# Patient Record
Sex: FEMALE | Race: WHITE | NOT HISPANIC OR LATINO
[De-identification: names, ages, dates, MRNs, and addresses within clinical notes are randomized per-mention and may not be internally consistent; named-entity substitution may affect disease eponyms.]

---

## 2017-03-17 ENCOUNTER — RX RENEWAL (OUTPATIENT)
Age: 67
End: 2017-03-17

## 2017-04-05 ENCOUNTER — NON-APPOINTMENT (OUTPATIENT)
Age: 67
End: 2017-04-05

## 2017-04-05 ENCOUNTER — APPOINTMENT (OUTPATIENT)
Dept: CARDIOLOGY | Facility: CLINIC | Age: 67
End: 2017-04-05

## 2017-04-05 VITALS
DIASTOLIC BLOOD PRESSURE: 76 MMHG | OXYGEN SATURATION: 95 % | SYSTOLIC BLOOD PRESSURE: 126 MMHG | BODY MASS INDEX: 28.71 KG/M2 | WEIGHT: 156 LBS | HEART RATE: 72 BPM | HEIGHT: 62 IN

## 2017-10-03 RX ORDER — AMOXICILLIN AND CLAVULANATE POTASSIUM 875; 125 MG/1; MG/1
875-125 TABLET, COATED ORAL
Qty: 20 | Refills: 0 | Status: COMPLETED | COMMUNITY
Start: 2017-05-01

## 2017-10-04 ENCOUNTER — NON-APPOINTMENT (OUTPATIENT)
Age: 67
End: 2017-10-04

## 2017-10-04 ENCOUNTER — APPOINTMENT (OUTPATIENT)
Dept: CARDIOLOGY | Facility: CLINIC | Age: 67
End: 2017-10-04
Payer: COMMERCIAL

## 2017-10-04 VITALS
WEIGHT: 153 LBS | BODY MASS INDEX: 28.16 KG/M2 | SYSTOLIC BLOOD PRESSURE: 120 MMHG | DIASTOLIC BLOOD PRESSURE: 72 MMHG | HEART RATE: 62 BPM | HEIGHT: 62 IN | OXYGEN SATURATION: 95 %

## 2017-10-04 PROCEDURE — 93000 ELECTROCARDIOGRAM COMPLETE: CPT

## 2017-10-04 PROCEDURE — 99214 OFFICE O/P EST MOD 30 MIN: CPT

## 2017-12-28 ENCOUNTER — APPOINTMENT (OUTPATIENT)
Dept: CARDIOLOGY | Facility: CLINIC | Age: 67
End: 2017-12-28
Payer: MEDICARE

## 2017-12-28 ENCOUNTER — NON-APPOINTMENT (OUTPATIENT)
Age: 67
End: 2017-12-28

## 2017-12-28 VITALS — HEART RATE: 67 BPM | OXYGEN SATURATION: 96 %

## 2017-12-28 VITALS
WEIGHT: 154 LBS | SYSTOLIC BLOOD PRESSURE: 148 MMHG | BODY MASS INDEX: 28.34 KG/M2 | OXYGEN SATURATION: 92 % | HEART RATE: 93 BPM | HEIGHT: 62 IN | DIASTOLIC BLOOD PRESSURE: 83 MMHG

## 2017-12-28 PROCEDURE — 93000 ELECTROCARDIOGRAM COMPLETE: CPT

## 2017-12-28 PROCEDURE — 99214 OFFICE O/P EST MOD 30 MIN: CPT

## 2018-03-03 ENCOUNTER — RX RENEWAL (OUTPATIENT)
Age: 68
End: 2018-03-03

## 2018-04-11 ENCOUNTER — NON-APPOINTMENT (OUTPATIENT)
Age: 68
End: 2018-04-11

## 2018-04-11 ENCOUNTER — APPOINTMENT (OUTPATIENT)
Dept: CARDIOLOGY | Facility: CLINIC | Age: 68
End: 2018-04-11
Payer: MEDICARE

## 2018-04-11 VITALS
DIASTOLIC BLOOD PRESSURE: 75 MMHG | SYSTOLIC BLOOD PRESSURE: 120 MMHG | OXYGEN SATURATION: 95 % | WEIGHT: 155 LBS | HEART RATE: 65 BPM | HEIGHT: 62 IN | BODY MASS INDEX: 28.52 KG/M2

## 2018-04-11 PROCEDURE — 99214 OFFICE O/P EST MOD 30 MIN: CPT

## 2018-04-11 PROCEDURE — 93000 ELECTROCARDIOGRAM COMPLETE: CPT

## 2018-08-15 ENCOUNTER — NON-APPOINTMENT (OUTPATIENT)
Age: 68
End: 2018-08-15

## 2018-08-15 ENCOUNTER — APPOINTMENT (OUTPATIENT)
Dept: CARDIOLOGY | Facility: CLINIC | Age: 68
End: 2018-08-15
Payer: MEDICARE

## 2018-08-15 VITALS
OXYGEN SATURATION: 95 % | SYSTOLIC BLOOD PRESSURE: 126 MMHG | WEIGHT: 156 LBS | HEART RATE: 66 BPM | HEIGHT: 62 IN | DIASTOLIC BLOOD PRESSURE: 80 MMHG | BODY MASS INDEX: 28.71 KG/M2

## 2018-08-15 DIAGNOSIS — E83.52 HYPERCALCEMIA: ICD-10-CM

## 2018-08-15 PROCEDURE — 99214 OFFICE O/P EST MOD 30 MIN: CPT

## 2018-08-15 PROCEDURE — 93000 ELECTROCARDIOGRAM COMPLETE: CPT

## 2018-08-15 RX ORDER — NITROFURANTOIN (MONOHYDRATE/MACROCRYSTALS) 25; 75 MG/1; MG/1
100 CAPSULE ORAL
Qty: 6 | Refills: 0 | Status: DISCONTINUED | COMMUNITY
Start: 2017-09-22 | End: 2018-08-15

## 2018-12-19 ENCOUNTER — APPOINTMENT (OUTPATIENT)
Dept: CARDIOLOGY | Facility: CLINIC | Age: 68
End: 2018-12-19
Payer: MEDICARE

## 2018-12-19 ENCOUNTER — NON-APPOINTMENT (OUTPATIENT)
Age: 68
End: 2018-12-19

## 2018-12-19 VITALS
BODY MASS INDEX: 28.52 KG/M2 | WEIGHT: 155 LBS | DIASTOLIC BLOOD PRESSURE: 84 MMHG | HEIGHT: 62 IN | SYSTOLIC BLOOD PRESSURE: 137 MMHG | HEART RATE: 66 BPM | OXYGEN SATURATION: 98 %

## 2018-12-19 PROCEDURE — 93000 ELECTROCARDIOGRAM COMPLETE: CPT

## 2018-12-19 PROCEDURE — 99214 OFFICE O/P EST MOD 30 MIN: CPT

## 2018-12-19 RX ORDER — OSELTAMIVIR PHOSPHATE 75 MG/1
75 CAPSULE ORAL
Qty: 10 | Refills: 0 | Status: COMPLETED | COMMUNITY
Start: 2018-09-18

## 2018-12-19 NOTE — REASON FOR VISIT
[FreeTextEntry1] : The patient comes in for followup of her hypertension, hypothyroidism, left bundle-branch block, and dyslipidemia. She had her partial hysterectomy and bladder resuspension. It was not completely successful from her point of view. . She denies any chest pains, shortness of breath, or palpitations. .

## 2018-12-19 NOTE — DISCUSSION/SUMMARY
[FreeTextEntry1] : The patient was examined. Her blood pressure was 137/84,  and her  pulse was 66..Her lungs were clear to auscultation. Cardiac exam was negative for murmurs rubs or gallops. The remainder of her physical exam was unremarkable. Her EKG showed  sinus bradycardia with a left bundle branch block pattern. This was unchanged from previous.The patient was instructed to stay on the same medication, and  return in 4  months, or earlier if needed.

## 2018-12-19 NOTE — PHYSICAL EXAM
[General Appearance - Well Developed] : well developed [Normal Appearance] : normal appearance [Well Groomed] : well groomed [General Appearance - Well Nourished] : well nourished [No Deformities] : no deformities [General Appearance - In No Acute Distress] : no acute distress [Normal Conjunctiva] : the conjunctiva exhibited no abnormalities [Eyelids - No Xanthelasma] : the eyelids demonstrated no xanthelasmas [Normal Oral Mucosa] : normal oral mucosa [No Oral Pallor] : no oral pallor [No Oral Cyanosis] : no oral cyanosis [Normal Jugular Venous A Waves Present] : normal jugular venous A waves present [Normal Jugular Venous V Waves Present] : normal jugular venous V waves present [No Jugular Venous Jeong A Waves] : no jugular venous jeong A waves [Respiration, Rhythm And Depth] : normal respiratory rhythm and effort [Exaggerated Use Of Accessory Muscles For Inspiration] : no accessory muscle use [Auscultation Breath Sounds / Voice Sounds] : lungs were clear to auscultation bilaterally [Heart Rate And Rhythm] : heart rate and rhythm were normal [Heart Sounds] : normal S1 and S2 [Murmurs] : no murmurs present [Abdomen Soft] : soft [Abdomen Tenderness] : non-tender [Abdomen Mass (___ Cm)] : no abdominal mass palpated [Abnormal Walk] : normal gait [Gait - Sufficient For Exercise Testing] : the gait was sufficient for exercise testing [Nail Clubbing] : no clubbing of the fingernails [Cyanosis, Localized] : no localized cyanosis [Petechial Hemorrhages (___cm)] : no petechial hemorrhages [Skin Color & Pigmentation] : normal skin color and pigmentation [] : no rash [No Venous Stasis] : no venous stasis [Skin Lesions] : no skin lesions [No Skin Ulcers] : no skin ulcer [No Xanthoma] : no  xanthoma was observed [Oriented To Time, Place, And Person] : oriented to person, place, and time [Affect] : the affect was normal [Mood] : the mood was normal [No Anxiety] : not feeling anxious

## 2019-02-18 ENCOUNTER — RX RENEWAL (OUTPATIENT)
Age: 69
End: 2019-02-18

## 2019-04-10 ENCOUNTER — APPOINTMENT (OUTPATIENT)
Dept: CARDIOLOGY | Facility: CLINIC | Age: 69
End: 2019-04-10
Payer: MEDICARE

## 2019-04-10 ENCOUNTER — NON-APPOINTMENT (OUTPATIENT)
Age: 69
End: 2019-04-10

## 2019-04-10 VITALS — HEIGHT: 62 IN | BODY MASS INDEX: 28.34 KG/M2 | WEIGHT: 154 LBS

## 2019-04-10 VITALS — HEART RATE: 57 BPM | DIASTOLIC BLOOD PRESSURE: 88 MMHG | SYSTOLIC BLOOD PRESSURE: 146 MMHG | OXYGEN SATURATION: 97 %

## 2019-04-10 PROCEDURE — 93000 ELECTROCARDIOGRAM COMPLETE: CPT

## 2019-04-10 PROCEDURE — 99214 OFFICE O/P EST MOD 30 MIN: CPT

## 2019-04-10 RX ORDER — AMOXICILLIN 500 MG/1
500 TABLET, FILM COATED ORAL
Qty: 16 | Refills: 0 | Status: COMPLETED | COMMUNITY
Start: 2018-12-31

## 2019-04-10 NOTE — PHYSICAL EXAM
[General Appearance - Well Developed] : well developed [General Appearance - Well Nourished] : well nourished [Normal Appearance] : normal appearance [Well Groomed] : well groomed [No Deformities] : no deformities [General Appearance - In No Acute Distress] : no acute distress [Eyelids - No Xanthelasma] : the eyelids demonstrated no xanthelasmas [Normal Conjunctiva] : the conjunctiva exhibited no abnormalities [Normal Oral Mucosa] : normal oral mucosa [No Oral Pallor] : no oral pallor [Normal Jugular Venous A Waves Present] : normal jugular venous A waves present [No Oral Cyanosis] : no oral cyanosis [Normal Jugular Venous V Waves Present] : normal jugular venous V waves present [No Jugular Venous Jeong A Waves] : no jugular venous jeong A waves [Respiration, Rhythm And Depth] : normal respiratory rhythm and effort [Exaggerated Use Of Accessory Muscles For Inspiration] : no accessory muscle use [Auscultation Breath Sounds / Voice Sounds] : lungs were clear to auscultation bilaterally [Heart Rate And Rhythm] : heart rate and rhythm were normal [Heart Sounds] : normal S1 and S2 [Abdomen Soft] : soft [Murmurs] : no murmurs present [Abdomen Tenderness] : non-tender [Gait - Sufficient For Exercise Testing] : the gait was sufficient for exercise testing [Abnormal Walk] : normal gait [Abdomen Mass (___ Cm)] : no abdominal mass palpated [Nail Clubbing] : no clubbing of the fingernails [Cyanosis, Localized] : no localized cyanosis [Petechial Hemorrhages (___cm)] : no petechial hemorrhages [Skin Color & Pigmentation] : normal skin color and pigmentation [No Venous Stasis] : no venous stasis [] : no rash [Skin Lesions] : no skin lesions [No Xanthoma] : no  xanthoma was observed [No Skin Ulcers] : no skin ulcer [Affect] : the affect was normal [Oriented To Time, Place, And Person] : oriented to person, place, and time [Mood] : the mood was normal [No Anxiety] : not feeling anxious

## 2019-04-10 NOTE — DISCUSSION/SUMMARY
[FreeTextEntry1] : The patient was examined. Her blood pressure was 146/88,  and her  pulse was 56..Her lungs were clear to auscultation. Cardiac exam was negative for murmurs rubs or gallops. The remainder of her physical exam was unremarkable. Her EKG showed  sinus bradycardia with a left bundle branch block pattern. This was unchanged from previous.The patient was instructed to stay on the same medication, and  return in 4  months, or earlier if needed.

## 2019-04-10 NOTE — REVIEW OF SYSTEMS
[Negative] : Endocrine [Shortness Of Breath] : no shortness of breath [Chest Pain] : no chest pain [Palpitations] : no palpitations

## 2019-05-13 ENCOUNTER — RX RENEWAL (OUTPATIENT)
Age: 69
End: 2019-05-13

## 2019-08-07 ENCOUNTER — APPOINTMENT (OUTPATIENT)
Dept: CARDIOLOGY | Facility: CLINIC | Age: 69
End: 2019-08-07
Payer: MEDICARE

## 2019-08-07 ENCOUNTER — NON-APPOINTMENT (OUTPATIENT)
Age: 69
End: 2019-08-07

## 2019-08-07 VITALS
SYSTOLIC BLOOD PRESSURE: 132 MMHG | DIASTOLIC BLOOD PRESSURE: 82 MMHG | WEIGHT: 148 LBS | OXYGEN SATURATION: 98 % | BODY MASS INDEX: 27.23 KG/M2 | HEART RATE: 54 BPM | RESPIRATION RATE: 14 BRPM | HEIGHT: 62 IN

## 2019-08-07 DIAGNOSIS — N83.202 UNSPECIFIED OVARIAN CYST, LEFT SIDE: ICD-10-CM

## 2019-08-07 PROCEDURE — 99214 OFFICE O/P EST MOD 30 MIN: CPT

## 2019-08-07 PROCEDURE — 93000 ELECTROCARDIOGRAM COMPLETE: CPT

## 2019-08-07 RX ORDER — SODIUM SULFATE, POTASSIUM SULFATE, MAGNESIUM SULFATE 17.5; 3.13; 1.6 G/ML; G/ML; G/ML
17.5-3.13-1.6 SOLUTION, CONCENTRATE ORAL
Qty: 354 | Refills: 0 | Status: COMPLETED | COMMUNITY
Start: 2019-04-17

## 2019-08-07 NOTE — DISCUSSION/SUMMARY
[FreeTextEntry1] : The patient was examined. Her blood pressure was 132/82,  and her  pulse was 54..Her lungs were clear to auscultation. Cardiac exam was negative for murmurs rubs or gallops. The remainder of her physical exam was unremarkable. Her EKG showed  sinus bradycardia with a left bundle branch block pattern. This was unchanged from previous.The patient was instructed to stay on the same medication, and  return in 4  months, or earlier if needed.

## 2019-08-07 NOTE — REASON FOR VISIT
[FreeTextEntry1] : The patient comes in for followup of her hypertension, hypothyroidism, left bundle-branch block, and dyslipidemia. She had her partial hysterectomy and bladder resuspension. It was not completely successful from her point of view. . She denies any chest pains, shortness of breath, or palpitations. .\par August 7, 2019: The patient feels well. She has no new complaints. She denies any chest pains, shortness breath, or palpitations. She is walking more.

## 2019-08-08 ENCOUNTER — RESULT REVIEW (OUTPATIENT)
Age: 69
End: 2019-08-08

## 2019-08-08 LAB
25(OH)D3 SERPL-MCNC: 60.8 NG/ML
ALBUMIN SERPL ELPH-MCNC: 4.4 G/DL
ALP BLD-CCNC: 53 U/L
ALT SERPL-CCNC: 17 U/L
ANION GAP SERPL CALC-SCNC: 10 MMOL/L
AST SERPL-CCNC: 19 U/L
BASOPHILS # BLD AUTO: 0.02 K/UL
BASOPHILS NFR BLD AUTO: 0.4 %
BILIRUB SERPL-MCNC: 0.4 MG/DL
BUN SERPL-MCNC: 22 MG/DL
CALCIUM SERPL-MCNC: 10.3 MG/DL
CHLORIDE SERPL-SCNC: 106 MMOL/L
CHOLEST SERPL-MCNC: 146 MG/DL
CHOLEST/HDLC SERPL: 2.5 RATIO
CO2 SERPL-SCNC: 24 MMOL/L
CREAT SERPL-MCNC: 0.68 MG/DL
EOSINOPHIL # BLD AUTO: 0.07 K/UL
EOSINOPHIL NFR BLD AUTO: 1.3 %
ESTIMATED AVERAGE GLUCOSE: 117 MG/DL
GLUCOSE SERPL-MCNC: 98 MG/DL
HBA1C MFR BLD HPLC: 5.7 %
HCT VFR BLD CALC: 44.8 %
HDLC SERPL-MCNC: 59 MG/DL
HGB BLD-MCNC: 14.2 G/DL
IMM GRANULOCYTES NFR BLD AUTO: 0.2 %
LDLC SERPL CALC-MCNC: 64 MG/DL
LYMPHOCYTES # BLD AUTO: 0.84 K/UL
LYMPHOCYTES NFR BLD AUTO: 15.9 %
MAN DIFF?: NORMAL
MCHC RBC-ENTMCNC: 31.3 PG
MCHC RBC-ENTMCNC: 31.7 GM/DL
MCV RBC AUTO: 98.7 FL
MONOCYTES # BLD AUTO: 0.4 K/UL
MONOCYTES NFR BLD AUTO: 7.6 %
NEUTROPHILS # BLD AUTO: 3.95 K/UL
NEUTROPHILS NFR BLD AUTO: 74.6 %
PLATELET # BLD AUTO: 220 K/UL
POTASSIUM SERPL-SCNC: 4.8 MMOL/L
PROT SERPL-MCNC: 6.8 G/DL
RBC # BLD: 4.54 M/UL
RBC # FLD: 14.7 %
SODIUM SERPL-SCNC: 140 MMOL/L
T4 SERPL-MCNC: 7.4 UG/DL
TRIGL SERPL-MCNC: 113 MG/DL
TSH SERPL-ACNC: 1.49 UIU/ML
WBC # FLD AUTO: 5.29 K/UL

## 2019-12-04 ENCOUNTER — APPOINTMENT (OUTPATIENT)
Dept: CARDIOLOGY | Facility: CLINIC | Age: 69
End: 2019-12-04
Payer: MEDICARE

## 2019-12-04 ENCOUNTER — NON-APPOINTMENT (OUTPATIENT)
Age: 69
End: 2019-12-04

## 2019-12-04 VITALS
HEART RATE: 65 BPM | BODY MASS INDEX: 27.44 KG/M2 | OXYGEN SATURATION: 97 % | SYSTOLIC BLOOD PRESSURE: 134 MMHG | WEIGHT: 150 LBS | DIASTOLIC BLOOD PRESSURE: 84 MMHG

## 2019-12-04 PROCEDURE — 99214 OFFICE O/P EST MOD 30 MIN: CPT

## 2019-12-04 PROCEDURE — 93000 ELECTROCARDIOGRAM COMPLETE: CPT

## 2019-12-04 NOTE — REASON FOR VISIT
[FreeTextEntry1] : The patient comes in for followup of her hypertension, hypothyroidism, left bundle-branch block, and dyslipidemia. She had her partial hysterectomy and bladder resuspension. It was not completely successful from her point of view. . She denies any chest pains, shortness of breath, or palpitations. .\par August 7, 2019: The patient feels well. She has no new complaints. She denies any chest pains, shortness breath, or palpitations. She is walking more.\par December 4, 2019: The patient has an upper respiratory infection. Her  has bronchitis. She denies any chest pains, shortness breath, or palpitations. She is a slight cough and some nasal congestion.

## 2019-12-04 NOTE — PHYSICAL EXAM
[General Appearance - Well Developed] : well developed [Normal Appearance] : normal appearance [Well Groomed] : well groomed [General Appearance - Well Nourished] : well nourished [No Deformities] : no deformities [General Appearance - In No Acute Distress] : no acute distress [Normal Conjunctiva] : the conjunctiva exhibited no abnormalities [Eyelids - No Xanthelasma] : the eyelids demonstrated no xanthelasmas [Normal Oral Mucosa] : normal oral mucosa [No Oral Pallor] : no oral pallor [Normal Jugular Venous A Waves Present] : normal jugular venous A waves present [No Oral Cyanosis] : no oral cyanosis [No Jugular Venous Jeong A Waves] : no jugular venous jeong A waves [Normal Jugular Venous V Waves Present] : normal jugular venous V waves present [Respiration, Rhythm And Depth] : normal respiratory rhythm and effort [Exaggerated Use Of Accessory Muscles For Inspiration] : no accessory muscle use [Auscultation Breath Sounds / Voice Sounds] : lungs were clear to auscultation bilaterally [Heart Rate And Rhythm] : heart rate and rhythm were normal [Heart Sounds] : normal S1 and S2 [Murmurs] : no murmurs present [Abdomen Soft] : soft [Abdomen Tenderness] : non-tender [Abdomen Mass (___ Cm)] : no abdominal mass palpated [Gait - Sufficient For Exercise Testing] : the gait was sufficient for exercise testing [Abnormal Walk] : normal gait [Nail Clubbing] : no clubbing of the fingernails [Cyanosis, Localized] : no localized cyanosis [Petechial Hemorrhages (___cm)] : no petechial hemorrhages [Skin Color & Pigmentation] : normal skin color and pigmentation [] : no rash [No Venous Stasis] : no venous stasis [Skin Lesions] : no skin lesions [Oriented To Time, Place, And Person] : oriented to person, place, and time [No Skin Ulcers] : no skin ulcer [No Xanthoma] : no  xanthoma was observed [Mood] : the mood was normal [Affect] : the affect was normal [No Anxiety] : not feeling anxious

## 2019-12-04 NOTE — REVIEW OF SYSTEMS
[Negative] : Heme/Lymph [see HPI] : see HPI [Cough] : cough [Sore Throat] : no sore throat [Shortness Of Breath] : no shortness of breath [Chest Pain] : no chest pain [Palpitations] : no palpitations

## 2019-12-04 NOTE — DISCUSSION/SUMMARY
[FreeTextEntry1] : The patient was examined. Her blood pressure was 134/84,  and her pulse was 65..Her lungs were clear to auscultation. Cardiac exam was negative for murmurs rubs or gallops. The remainder of her physical exam was unremarkable. Her EKG showed  sinus rhythm, with a left bundle branch block pattern. This was unchanged from previous.The patient was instructed to stay on the same medication, and  return in 4  months, or earlier if needed.

## 2020-05-04 ENCOUNTER — RX RENEWAL (OUTPATIENT)
Age: 70
End: 2020-05-04

## 2020-06-10 ENCOUNTER — NON-APPOINTMENT (OUTPATIENT)
Age: 70
End: 2020-06-10

## 2020-06-10 ENCOUNTER — APPOINTMENT (OUTPATIENT)
Dept: CARDIOLOGY | Facility: CLINIC | Age: 70
End: 2020-06-10
Payer: MEDICARE

## 2020-06-10 VITALS
HEART RATE: 64 BPM | OXYGEN SATURATION: 94 % | WEIGHT: 151 LBS | BODY MASS INDEX: 27.79 KG/M2 | DIASTOLIC BLOOD PRESSURE: 81 MMHG | HEIGHT: 62 IN | TEMPERATURE: 97.9 F | SYSTOLIC BLOOD PRESSURE: 134 MMHG

## 2020-06-10 VITALS — HEART RATE: 60 BPM | OXYGEN SATURATION: 98 %

## 2020-06-10 PROCEDURE — 93000 ELECTROCARDIOGRAM COMPLETE: CPT

## 2020-06-10 PROCEDURE — 99214 OFFICE O/P EST MOD 30 MIN: CPT

## 2020-06-10 NOTE — REVIEW OF SYSTEMS
[see HPI] : see HPI [Negative] : Heme/Lymph [Sore Throat] : no sore throat [Shortness Of Breath] : no shortness of breath [Chest Pain] : no chest pain [Palpitations] : no palpitations [Cough] : no cough

## 2020-06-10 NOTE — REASON FOR VISIT
[FreeTextEntry1] : The patient comes in for followup of her hypertension, hypothyroidism, left bundle-branch block, and dyslipidemia. She had her partial hysterectomy and bladder resuspension. It was not completely successful from her point of view. . She denies any chest pains, shortness of breath, or palpitations. .\par August 7, 2019: The patient feels well. She has no new complaints. She denies any chest pains, shortness breath, or palpitations. She is walking more.\par December 4, 2019: The patient has an upper respiratory infection. Her  has bronchitis. She denies any chest pains, shortness breath, or palpitations. She is a slight cough and some nasal congestion.\par Jessi 10, 2020: Patient feels well.  She denies any chest pains, shortness of breath, or palpitations.  She will be seeing her primary care physician within the next several weeks and he will be doing a blood test.  We will try to get a scan results.

## 2020-06-10 NOTE — PHYSICAL EXAM
[General Appearance - Well Developed] : well developed [Normal Appearance] : normal appearance [Well Groomed] : well groomed [No Deformities] : no deformities [General Appearance - Well Nourished] : well nourished [Normal Conjunctiva] : the conjunctiva exhibited no abnormalities [General Appearance - In No Acute Distress] : no acute distress [Eyelids - No Xanthelasma] : the eyelids demonstrated no xanthelasmas [Normal Oral Mucosa] : normal oral mucosa [No Oral Pallor] : no oral pallor [No Oral Cyanosis] : no oral cyanosis [Normal Jugular Venous A Waves Present] : normal jugular venous A waves present [Normal Jugular Venous V Waves Present] : normal jugular venous V waves present [No Jugular Venous Jeong A Waves] : no jugular venous jeong A waves [Respiration, Rhythm And Depth] : normal respiratory rhythm and effort [Exaggerated Use Of Accessory Muscles For Inspiration] : no accessory muscle use [Auscultation Breath Sounds / Voice Sounds] : lungs were clear to auscultation bilaterally [Heart Rate And Rhythm] : heart rate and rhythm were normal [Murmurs] : no murmurs present [Heart Sounds] : normal S1 and S2 [Abdomen Soft] : soft [Abdomen Tenderness] : non-tender [Abdomen Mass (___ Cm)] : no abdominal mass palpated [Abnormal Walk] : normal gait [Gait - Sufficient For Exercise Testing] : the gait was sufficient for exercise testing [Nail Clubbing] : no clubbing of the fingernails [Cyanosis, Localized] : no localized cyanosis [Petechial Hemorrhages (___cm)] : no petechial hemorrhages [Skin Color & Pigmentation] : normal skin color and pigmentation [] : no rash [No Venous Stasis] : no venous stasis [Skin Lesions] : no skin lesions [No Skin Ulcers] : no skin ulcer [No Xanthoma] : no  xanthoma was observed [Oriented To Time, Place, And Person] : oriented to person, place, and time [Affect] : the affect was normal [Mood] : the mood was normal [No Anxiety] : not feeling anxious

## 2020-06-10 NOTE — DISCUSSION/SUMMARY
[FreeTextEntry1] : The patient was examined. Her blood pressure was 134/81,  and her pulse was 60..Her lungs were clear to auscultation. Cardiac exam was negative for murmurs rubs or gallops. The remainder of her physical exam was unremarkable. Her EKG showed  sinus rhythm, with a left bundle branch block pattern. This was unchanged from previous.The patient was instructed to stay on the same medication, and  return in 6  months, or earlier if needed.

## 2020-06-30 ENCOUNTER — FORM ENCOUNTER (OUTPATIENT)
Age: 70
End: 2020-06-30

## 2020-12-09 ENCOUNTER — APPOINTMENT (OUTPATIENT)
Dept: CARDIOLOGY | Facility: CLINIC | Age: 70
End: 2020-12-09
Payer: MEDICARE

## 2020-12-09 ENCOUNTER — NON-APPOINTMENT (OUTPATIENT)
Age: 70
End: 2020-12-09

## 2020-12-09 VITALS
HEART RATE: 63 BPM | WEIGHT: 156 LBS | BODY MASS INDEX: 28.71 KG/M2 | OXYGEN SATURATION: 98 % | SYSTOLIC BLOOD PRESSURE: 150 MMHG | DIASTOLIC BLOOD PRESSURE: 93 MMHG | HEIGHT: 62 IN

## 2020-12-09 VITALS — DIASTOLIC BLOOD PRESSURE: 86 MMHG | SYSTOLIC BLOOD PRESSURE: 124 MMHG

## 2020-12-09 PROCEDURE — 93000 ELECTROCARDIOGRAM COMPLETE: CPT

## 2020-12-09 PROCEDURE — 99214 OFFICE O/P EST MOD 30 MIN: CPT

## 2020-12-09 NOTE — REASON FOR VISIT
[FreeTextEntry1] : The patient comes in for followup of her hypertension, hypothyroidism, left bundle-branch block, and dyslipidemia. She had her partial hysterectomy and bladder resuspension. It was not completely successful from her point of view. . She denies any chest pains, shortness of breath, or palpitations. .\par August 7, 2019: The patient feels well. She has no new complaints. She denies any chest pains, shortness breath, or palpitations. She is walking more.\par December 4, 2019: The patient has an upper respiratory infection. Her  has bronchitis. She denies any chest pains, shortness breath, or palpitations. She is a slight cough and some nasal congestion.\par Jessi 10, 2020: Patient feels well.  She denies any chest pains, shortness of breath, or palpitations.  She will be seeing her primary care physician within the next several weeks and he will be doing a blood test.  We will try to get a scan results.\par December 9, 2020: The patient fell going down stairs in the beginning of November 2020 and hurt her right anterior chest.  She now only has occasional pains in the right anterior and right lateral upper chest.  She denies any other chest pains, shortness of breath, or palpitations.  She admits to not watching salt as carefully as she should and her diet

## 2021-06-09 ENCOUNTER — NON-APPOINTMENT (OUTPATIENT)
Age: 71
End: 2021-06-09

## 2021-06-09 ENCOUNTER — APPOINTMENT (OUTPATIENT)
Dept: CARDIOLOGY | Facility: CLINIC | Age: 71
End: 2021-06-09
Payer: MEDICARE

## 2021-06-09 VITALS
HEART RATE: 62 BPM | DIASTOLIC BLOOD PRESSURE: 81 MMHG | SYSTOLIC BLOOD PRESSURE: 135 MMHG | OXYGEN SATURATION: 95 % | TEMPERATURE: 97.7 F | BODY MASS INDEX: 28.71 KG/M2 | WEIGHT: 156 LBS | HEIGHT: 62 IN

## 2021-06-09 PROCEDURE — 93000 ELECTROCARDIOGRAM COMPLETE: CPT

## 2021-06-09 PROCEDURE — 99214 OFFICE O/P EST MOD 30 MIN: CPT

## 2021-06-09 NOTE — DISCUSSION/SUMMARY
[FreeTextEntry1] : The patient was examined. Her blood pressure was 135/81,  and her pulse was 62..Her lungs were clear to auscultation. Cardiac exam was negative for murmurs rubs or gallops. The remainder of her physical exam was unremarkable. Her EKG showed  sinus bradycardia, with a left bundle branch block pattern. This was unchanged from previous.The patient was instructed to stay on the same medication, and  return in 6  months, or earlier if needed.  Total time spent on the day of the encounter was 31 minutes which includes  face-to-face and non face-to-face times personally spent by the physician preparing to see the patient, obtaining  separately obtained history, performing a medically appropriate exam and evaluation, counseling, educating, talking to the family or caregivers, ordering medicines, ordering tests or procedures, referring and communicating with other healthcare foods professionals, and documenting clinical information in the electronic health record.

## 2021-06-09 NOTE — REASON FOR VISIT
[FreeTextEntry1] : The patient comes in for followup of her hypertension, hypothyroidism, left bundle-branch block, and dyslipidemia. She had her partial hysterectomy and bladder resuspension. It was not completely successful from her point of view. . She denies any chest pains, shortness of breath, or palpitations. .\par August 7, 2019: The patient feels well. She has no new complaints. She denies any chest pains, shortness breath, or palpitations. She is walking more.\par December 4, 2019: The patient has an upper respiratory infection. Her  has bronchitis. She denies any chest pains, shortness breath, or palpitations. She is a slight cough and some nasal congestion.\par Jessi 10, 2020: Patient feels well.  She denies any chest pains, shortness of breath, or palpitations.  She will be seeing her primary care physician within the next several weeks and he will be doing a blood test.  We will try to get a scan results.\par December 9, 2020: The patient fell going down stairs in the beginning of November 2020 and hurt her right anterior chest.  She now only has occasional pains in the right anterior and right lateral upper chest.  She denies any other chest pains, shortness of breath, or palpitations.  She admits to not watching salt as carefully as she should and her diet\par June 9, 2021: The patient feels well.  She got her COVID-19 vaccinations.  She denies any chest pains, shortness of breath, or palpitations.

## 2021-12-08 ENCOUNTER — APPOINTMENT (OUTPATIENT)
Dept: CARDIOLOGY | Facility: CLINIC | Age: 71
End: 2021-12-08
Payer: MEDICARE

## 2021-12-08 ENCOUNTER — NON-APPOINTMENT (OUTPATIENT)
Age: 71
End: 2021-12-08

## 2021-12-08 VITALS
DIASTOLIC BLOOD PRESSURE: 77 MMHG | WEIGHT: 149 LBS | HEART RATE: 71 BPM | SYSTOLIC BLOOD PRESSURE: 145 MMHG | BODY MASS INDEX: 27.25 KG/M2 | OXYGEN SATURATION: 98 %

## 2021-12-08 DIAGNOSIS — E03.9 HYPOTHYROIDISM, UNSPECIFIED: ICD-10-CM

## 2021-12-08 DIAGNOSIS — M48.00 SPINAL STENOSIS, SITE UNSPECIFIED: ICD-10-CM

## 2021-12-08 PROCEDURE — 99214 OFFICE O/P EST MOD 30 MIN: CPT

## 2021-12-08 PROCEDURE — 93000 ELECTROCARDIOGRAM COMPLETE: CPT

## 2021-12-08 NOTE — DISCUSSION/SUMMARY
[FreeTextEntry1] : The patient was examined. Her blood pressure was 145/77,  and her pulse was 71..Her lungs were clear to auscultation. Cardiac exam was negative for murmurs rubs or gallops. The remainder of her physical exam was unremarkable. Her EKG showed  sinus bradycardia, with a left bundle branch block pattern. This was unchanged from previous.The patient was instructed to stay on the same medication, and  return in 6  months, or earlier if needed.  Total time spent on the day of the encounter was 31 minutes which includes  face-to-face and non face-to-face times personally spent by the physician preparing to see the patient, obtaining  separately obtained history, performing a medically appropriate exam and evaluation, counseling, educating, talking to the family or caregivers, ordering medicines, ordering tests or procedures, referring and communicating with other healthcare professionals, and documenting clinical information in the electronic health record.

## 2022-04-28 ENCOUNTER — RX RENEWAL (OUTPATIENT)
Age: 72
End: 2022-04-28

## 2022-06-08 ENCOUNTER — APPOINTMENT (OUTPATIENT)
Dept: CARDIOLOGY | Facility: CLINIC | Age: 72
End: 2022-06-08
Payer: MEDICARE

## 2022-06-08 ENCOUNTER — NON-APPOINTMENT (OUTPATIENT)
Age: 72
End: 2022-06-08

## 2022-06-08 VITALS
BODY MASS INDEX: 27.79 KG/M2 | DIASTOLIC BLOOD PRESSURE: 78 MMHG | WEIGHT: 151 LBS | OXYGEN SATURATION: 96 % | HEART RATE: 62 BPM | HEIGHT: 62 IN | SYSTOLIC BLOOD PRESSURE: 124 MMHG

## 2022-06-08 PROCEDURE — 93000 ELECTROCARDIOGRAM COMPLETE: CPT

## 2022-06-08 PROCEDURE — 99214 OFFICE O/P EST MOD 30 MIN: CPT

## 2022-06-08 NOTE — DISCUSSION/SUMMARY
[FreeTextEntry1] : The patient was examined. Her blood pressure was 124/78,  and her pulse was 62..Her lungs were clear to auscultation. Cardiac exam was negative for murmurs rubs or gallops. The remainder of her physical exam was unremarkable. Her EKG showed  sinus rhythm, with a left bundle branch block pattern. This was unchanged from previous.The patient was instructed to stay on the same medication, and  return in 6  months, or earlier if needed.  Total time spent on the day of the encounter was 32 minutes which includes  face-to-face and non face-to-face times personally spent by the physician preparing to see the patient, obtaining  separately obtained history, performing a medically appropriate exam and evaluation, counseling, educating, talking to the family or caregivers, ordering medicines, ordering tests or procedures, referring and communicating with other healthcare professionals, and documenting clinical information in the electronic health record.

## 2022-06-08 NOTE — REASON FOR VISIT
[FreeTextEntry1] : The patient comes in for followup of her hypertension, hypothyroidism, left bundle-branch block, and dyslipidemia. She had her partial hysterectomy and bladder resuspension. It was not completely successful from her point of view. . She denies any chest pains, shortness of breath, or palpitations. .\par August 7, 2019: The patient feels well. She has no new complaints. She denies any chest pains, shortness breath, or palpitations. She is walking more.\par December 4, 2019: The patient has an upper respiratory infection. Her  has bronchitis. She denies any chest pains, shortness breath, or palpitations. She is a slight cough and some nasal congestion.\par Jessi 10, 2020: Patient feels well.  She denies any chest pains, shortness of breath, or palpitations.  She will be seeing her primary care physician within the next several weeks and he will be doing a blood test.  We will try to get a scan results.\par December 9, 2020: The patient fell going down stairs in the beginning of November 2020 and hurt her right anterior chest.  She now only has occasional pains in the right anterior and right lateral upper chest.  She denies any other chest pains, shortness of breath, or palpitations.  She admits to not watching salt as carefully as she should and her diet\par June 9, 2021: The patient feels well.  She got her COVID-19 vaccinations.  She denies any chest pains, shortness of breath, or palpitations.\par December 8, 2021: The patient has been diagnosed with spinal stenosis.  She went to a pain management doctor who has given her to epidural steroid injections.  She denies any chest pains, shortness of breath, or palpitations.\par June 8, 2022: The patient got her epidural steroid shots and her spinal stenosis seems to be better.  She denies any chest pains, shortness of breath, or palpitations.

## 2022-07-08 DIAGNOSIS — Z87.42 PERSONAL HISTORY OF OTHER DISEASES OF THE FEMALE GENITAL TRACT: ICD-10-CM

## 2022-07-08 DIAGNOSIS — Z20.828 CONTACT WITH AND (SUSPECTED) EXPOSURE TO OTHER VIRAL COMMUNICABLE DISEASES: ICD-10-CM

## 2022-07-08 DIAGNOSIS — Z98.890 OTHER SPECIFIED POSTPROCEDURAL STATES: ICD-10-CM

## 2022-07-08 DIAGNOSIS — N60.19 DIFFUSE CYSTIC MASTOPATHY OF UNSPECIFIED BREAST: ICD-10-CM

## 2022-07-08 DIAGNOSIS — R31.29 OTHER MICROSCOPIC HEMATURIA: ICD-10-CM

## 2022-07-08 DIAGNOSIS — N81.6 RECTOCELE: ICD-10-CM

## 2022-07-08 DIAGNOSIS — N81.11 CYSTOCELE, MIDLINE: ICD-10-CM

## 2022-07-26 DIAGNOSIS — R92.8 OTHER ABNORMAL AND INCONCLUSIVE FINDINGS ON DIAGNOSTIC IMAGING OF BREAST: ICD-10-CM

## 2022-07-29 ENCOUNTER — APPOINTMENT (OUTPATIENT)
Dept: OBGYN | Facility: CLINIC | Age: 72
End: 2022-07-29

## 2022-07-29 VITALS
DIASTOLIC BLOOD PRESSURE: 70 MMHG | HEIGHT: 62 IN | WEIGHT: 152 LBS | SYSTOLIC BLOOD PRESSURE: 128 MMHG | RESPIRATION RATE: 12 BRPM | OXYGEN SATURATION: 98 % | BODY MASS INDEX: 27.97 KG/M2 | HEART RATE: 64 BPM

## 2022-07-29 DIAGNOSIS — Z12.39 ENCOUNTER FOR OTHER SCREENING FOR MALIGNANT NEOPLASM OF BREAST: ICD-10-CM

## 2022-07-29 DIAGNOSIS — Z12.4 ENCOUNTER FOR SCREENING FOR MALIGNANT NEOPLASM OF CERVIX: ICD-10-CM

## 2022-07-29 DIAGNOSIS — N39.3 STRESS INCONTINENCE (FEMALE) (MALE): ICD-10-CM

## 2022-07-29 DIAGNOSIS — Z01.419 ENCOUNTER FOR GYNECOLOGICAL EXAMINATION (GENERAL) (ROUTINE) W/OUT ABNORMAL FINDINGS: ICD-10-CM

## 2022-07-29 DIAGNOSIS — Z12.11 ENCOUNTER FOR SCREENING FOR MALIGNANT NEOPLASM OF COLON: ICD-10-CM

## 2022-07-29 DIAGNOSIS — Z13.31 ENCOUNTER FOR SCREENING FOR DEPRESSION: ICD-10-CM

## 2022-07-29 LAB
DATE COLLECTED: NORMAL
HEMOCCULT SP1 STL QL: NEGATIVE

## 2022-07-29 PROCEDURE — G0101: CPT

## 2022-07-29 PROCEDURE — 82270 OCCULT BLOOD FECES: CPT

## 2022-07-29 NOTE — HISTORY OF PRESENT ILLNESS
[Y] : Positive pregnancy history [Hot Flashes] : hot flashes [Night Sweats] : night sweats [unknown] : Patient is unsure of the date of her LMP [Currently Active] : currently active [Men] : men [No] : No [FreeTextEntry1] : Doing well, offers no complaints\par Recent Mammo/Sono [TextBox_4] : Presents for routine GYN care and offers no complaints. [Mammogramdate] : 7/22/22 [BreastSonogramDate] : 7/22/22 [ColonoscopyDate] : 2018 [BoneDensityDate] : 7/22/22 [PGHxTotal] : 2

## 2022-07-29 NOTE — PHYSICAL EXAM
[Examination Of The Breasts] : a normal appearance [No Masses] : no breast masses were palpable [Labia Majora] : normal [Labia Minora] : normal [Uterine Adnexae] : normal [Normal rectal exam] : was normal [Normal Brown Stool] : was normal and brown [Occult Blood Positive] : was negative for occult blood analysis [Internal Hemorrhoid] : no internal hemorrhoids were present [External Hemorrhoid] : no external hemorrhoids were present [Skin Tags] : no residual hemorrhoidal skin tags [Normal] : was normal [None] : there was no rectal mass

## 2022-07-29 NOTE — PLAN
[FreeTextEntry1] : 1)  Self breast exam instructions, calcium supplementation discussed with the patient.\par 2)  Mammography, lipid profile assessment, TSH screening, fasting glucose testing, colonoscopy screening were discussed with the patient.  Vitamin D supplementation\par 3)  Maintain healthy weight.\par 4)  Regular health maintenance with PCP.\par 5)  Remain tobacco free.\par 6)  Limit alcohol intake to less than 5 drinks per week.\par 7)  Osteoporosis screening.\par 8)  Annual cholesterol screening.\par 9)  Annual influenza vaccine.The importance of routine physical activity was reviewed and a goal of 150 minutes of moderately vigorous exercise per week was endorsed.\par .breast

## 2022-08-01 LAB — HPV HIGH+LOW RISK DNA PNL CVX: NOT DETECTED

## 2022-08-04 LAB — CYTOLOGY CVX/VAG DOC THIN PREP: NORMAL

## 2022-08-05 ENCOUNTER — NON-APPOINTMENT (OUTPATIENT)
Age: 72
End: 2022-08-05

## 2022-12-07 ENCOUNTER — APPOINTMENT (OUTPATIENT)
Dept: CARDIOLOGY | Facility: CLINIC | Age: 72
End: 2022-12-07

## 2022-12-12 ENCOUNTER — APPOINTMENT (OUTPATIENT)
Dept: CARDIOLOGY | Facility: CLINIC | Age: 72
End: 2022-12-12

## 2022-12-12 ENCOUNTER — NON-APPOINTMENT (OUTPATIENT)
Age: 72
End: 2022-12-12

## 2022-12-12 VITALS
BODY MASS INDEX: 27.23 KG/M2 | HEART RATE: 61 BPM | OXYGEN SATURATION: 98 % | WEIGHT: 148 LBS | DIASTOLIC BLOOD PRESSURE: 92 MMHG | SYSTOLIC BLOOD PRESSURE: 146 MMHG | HEIGHT: 62 IN

## 2022-12-12 PROCEDURE — 99214 OFFICE O/P EST MOD 30 MIN: CPT

## 2022-12-12 PROCEDURE — 93000 ELECTROCARDIOGRAM COMPLETE: CPT

## 2022-12-12 NOTE — DISCUSSION/SUMMARY
[EKG obtained to assist in diagnosis and management of assessed problem(s)] : EKG obtained to assist in diagnosis and management of assessed problem(s) [FreeTextEntry1] : The patient was examined. Her blood pressure was 146/92,  and her pulse was 61..Her lungs were clear to auscultation. Cardiac exam was negative for murmurs rubs or gallops. The remainder of her physical exam was unremarkable. Her EKG showed  sinus rhythm, with a left bundle branch block pattern. This was unchanged from previous.The patient was instructed to stay on the same medication, and  return in 6  months, or earlier if needed.  Total time spent on the day of the encounter was 32 minutes which includes  face-to-face and non face-to-face times personally spent by the physician preparing to see the patient, obtaining  separately obtained history, performing a medically appropriate exam and evaluation, counseling, educating, talking to the family or caregivers, ordering medicines, ordering tests or procedures, referring and communicating with other healthcare professionals, and documenting clinical information in the electronic health record.

## 2022-12-12 NOTE — REASON FOR VISIT
[FreeTextEntry1] : The patient comes in for followup of her hypertension, hypothyroidism, left bundle-branch block, and dyslipidemia. She had her partial hysterectomy and bladder resuspension. It was not completely successful from her point of view. . She denies any chest pains, shortness of breath, or palpitations. .\par August 7, 2019: The patient feels well. She has no new complaints. She denies any chest pains, shortness breath, or palpitations. She is walking more.\par December 4, 2019: The patient has an upper respiratory infection. Her  has bronchitis. She denies any chest pains, shortness breath, or palpitations. She is a slight cough and some nasal congestion.\par Jessi 10, 2020: Patient feels well.  She denies any chest pains, shortness of breath, or palpitations.  She will be seeing her primary care physician within the next several weeks and he will be doing a blood test.  We will try to get a scan results.\par December 9, 2020: The patient fell going down stairs in the beginning of November 2020 and hurt her right anterior chest.  She now only has occasional pains in the right anterior and right lateral upper chest.  She denies any other chest pains, shortness of breath, or palpitations.  She admits to not watching salt as carefully as she should and her diet\par June 9, 2021: The patient feels well.  She got her COVID-19 vaccinations.  She denies any chest pains, shortness of breath, or palpitations.\par December 8, 2021: The patient has been diagnosed with spinal stenosis.  She went to a pain management doctor who has given her to epidural steroid injections.  She denies any chest pains, shortness of breath, or palpitations.\par June 8, 2022: The patient got her epidural steroid shots and her spinal stenosis seems to be better.  She denies any chest pains, shortness of breath, or palpitations.\par December 12, 2022: The patient has no new complaints.  She denies any shortness of breath, palpitations, or chest pains.

## 2023-04-19 ENCOUNTER — RX RENEWAL (OUTPATIENT)
Age: 73
End: 2023-04-19

## 2023-04-26 ENCOUNTER — OFFICE (OUTPATIENT)
Dept: URBAN - METROPOLITAN AREA CLINIC 102 | Facility: CLINIC | Age: 73
Setting detail: OPHTHALMOLOGY
End: 2023-04-26
Payer: MEDICARE

## 2023-04-26 DIAGNOSIS — H16.221: ICD-10-CM

## 2023-04-26 DIAGNOSIS — H43.393: ICD-10-CM

## 2023-04-26 DIAGNOSIS — H01.002: ICD-10-CM

## 2023-04-26 DIAGNOSIS — H16.222: ICD-10-CM

## 2023-04-26 DIAGNOSIS — H01.001: ICD-10-CM

## 2023-04-26 DIAGNOSIS — H01.004: ICD-10-CM

## 2023-04-26 DIAGNOSIS — H16.223: ICD-10-CM

## 2023-04-26 DIAGNOSIS — H25.13: ICD-10-CM

## 2023-04-26 PROCEDURE — 83861 MICROFLUID ANALY TEARS: CPT | Performed by: OPHTHALMOLOGY

## 2023-04-26 PROCEDURE — 68761 CLOSE TEAR DUCT OPENING: CPT | Performed by: OPHTHALMOLOGY

## 2023-04-26 PROCEDURE — 92014 COMPRE OPH EXAM EST PT 1/>: CPT | Performed by: OPHTHALMOLOGY

## 2023-04-26 PROCEDURE — 92020 GONIOSCOPY: CPT | Performed by: OPHTHALMOLOGY

## 2023-04-26 ASSESSMENT — LID EXAM ASSESSMENTS
OS_BLEPHARITIS: T
OD_BLEPHARITIS: T
OS_TRICHIASIS: 1+

## 2023-04-26 ASSESSMENT — REFRACTION_MANIFEST
OS_VA1: 20/20
OD_SPHERE: +2.25
OD_AXIS: 138
OD_CYLINDER: -0.75
OS_ADD: +2.50
OS_AXIS: 065
OD_SPHERE: +2.75
OS_AXIS: 060
OD_VA1: 20/20
OD_AXIS: 135
OD_ADD: +2.50
OS_SPHERE: +2.75
OS_CYLINDER: -0.75
OD_CYLINDER: -1.25
OS_CYLINDER: -1.00
OS_SPHERE: +2.75
OS_ADD: +2.75
OD_ADD: +2.75

## 2023-04-26 ASSESSMENT — AXIALLENGTH_DERIVED
OD_AL: 22.4594
OD_AL: 22.3714
OS_AL: 22.162
OS_AL: 22.205
OS_AL: 22.0764
OD_AL: 22.2842

## 2023-04-26 ASSESSMENT — REFRACTION_CURRENTRX
OS_CYLINDER: -1.00
OD_CYLINDER: -0.50
OS_SPHERE: +2.75
OS_OVR_VA: 20/
OD_OVR_VA: 20/
OS_ADD: +2.50
OD_SPHERE: +2.25
OD_ADD: +2.50
OS_AXIS: 60
OD_AXIS: 134

## 2023-04-26 ASSESSMENT — CONFRONTATIONAL VISUAL FIELD TEST (CVF)
OD_FINDINGS: FULL
OS_FINDINGS: FULL

## 2023-04-26 ASSESSMENT — SPHEQUIV_DERIVED
OS_SPHEQUIV: 2.375
OD_SPHEQUIV: 2.375
OS_SPHEQUIV: 2.625
OD_SPHEQUIV: 1.875
OS_SPHEQUIV: 2.25
OD_SPHEQUIV: 2.125

## 2023-04-26 ASSESSMENT — VISUAL ACUITY
OS_BCVA: 20/20
OD_BCVA: 20/20-1

## 2023-04-26 ASSESSMENT — KERATOMETRY
OS_K2POWER_DIOPTERS: 45.25
OD_K1POWER_DIOPTERS: 44.25
OS_K1POWER_DIOPTERS: 45.00
OD_AXISANGLE_DEGREES: 55
OS_AXISANGLE_DEGREES: 126
OD_K2POWER_DIOPTERS: 45.25
METHOD_AUTO_MANUAL: AUTO

## 2023-04-26 ASSESSMENT — REFRACTION_AUTOREFRACTION
OS_AXIS: 66
OS_SPHERE: +3.00
OD_CYLINDER: -1.25
OS_CYLINDER: -0.75
OD_AXIS: 137
OD_SPHERE: +3.00

## 2023-04-26 ASSESSMENT — TONOMETRY
OS_IOP_MMHG: 16
OD_IOP_MMHG: 16

## 2023-04-26 ASSESSMENT — SUPERFICIAL PUNCTATE KERATITIS (SPK)
OD_SPK: T 1+
OS_SPK: T 1+

## 2023-04-26 ASSESSMENT — LID POSITION - DERMATOCHALASIS
OS_DERMATOCHALASIS: 2+
OD_DERMATOCHALASIS: 2+

## 2023-04-26 ASSESSMENT — CORNEAL PTERYGIUM: OS_PTERYGIUM: NASAL 1MM

## 2023-06-12 ENCOUNTER — APPOINTMENT (OUTPATIENT)
Dept: CARDIOLOGY | Facility: CLINIC | Age: 73
End: 2023-06-12
Payer: MEDICARE

## 2023-06-12 ENCOUNTER — NON-APPOINTMENT (OUTPATIENT)
Age: 73
End: 2023-06-12

## 2023-06-12 VITALS
HEIGHT: 62 IN | HEART RATE: 60 BPM | WEIGHT: 145 LBS | BODY MASS INDEX: 26.68 KG/M2 | OXYGEN SATURATION: 96 % | SYSTOLIC BLOOD PRESSURE: 133 MMHG | RESPIRATION RATE: 17 BRPM | DIASTOLIC BLOOD PRESSURE: 85 MMHG

## 2023-06-12 DIAGNOSIS — R73.03 PREDIABETES.: ICD-10-CM

## 2023-06-12 DIAGNOSIS — I44.7 LEFT BUNDLE-BRANCH BLOCK, UNSPECIFIED: ICD-10-CM

## 2023-06-12 PROCEDURE — 99214 OFFICE O/P EST MOD 30 MIN: CPT

## 2023-06-12 PROCEDURE — 93000 ELECTROCARDIOGRAM COMPLETE: CPT

## 2023-06-12 NOTE — PHYSICAL EXAM
[General Appearance - Well Developed] : well developed [Normal Appearance] : normal appearance [Well Groomed] : well groomed [General Appearance - Well Nourished] : well nourished [No Deformities] : no deformities [General Appearance - In No Acute Distress] : no acute distress [Normal Conjunctiva] : the conjunctiva exhibited no abnormalities [Eyelids - No Xanthelasma] : the eyelids demonstrated no xanthelasmas [Normal Oral Mucosa] : normal oral mucosa [No Oral Pallor] : no oral pallor [No Oral Cyanosis] : no oral cyanosis [Normal Jugular Venous A Waves Present] : normal jugular venous A waves present [Normal Jugular Venous V Waves Present] : normal jugular venous V waves present [No Jugular Venous Jeong A Waves] : no jugular venous jeong A waves [Exaggerated Use Of Accessory Muscles For Inspiration] : no accessory muscle use [Respiration, Rhythm And Depth] : normal respiratory rhythm and effort [Auscultation Breath Sounds / Voice Sounds] : lungs were clear to auscultation bilaterally [Heart Sounds] : normal S1 and S2 [Heart Rate And Rhythm] : heart rate and rhythm were normal [Murmurs] : no murmurs present [Abdomen Soft] : soft [Abdomen Tenderness] : non-tender [Abdomen Mass (___ Cm)] : no abdominal mass palpated [Abnormal Walk] : normal gait [Gait - Sufficient For Exercise Testing] : the gait was sufficient for exercise testing [Nail Clubbing] : no clubbing of the fingernails [Cyanosis, Localized] : no localized cyanosis [Petechial Hemorrhages (___cm)] : no petechial hemorrhages [Skin Color & Pigmentation] : normal skin color and pigmentation [] : no rash [No Venous Stasis] : no venous stasis [Skin Lesions] : no skin lesions [No Skin Ulcers] : no skin ulcer [No Xanthoma] : no  xanthoma was observed [Oriented To Time, Place, And Person] : oriented to person, place, and time [Affect] : the affect was normal [Mood] : the mood was normal [No Anxiety] : not feeling anxious

## 2023-06-12 NOTE — DISCUSSION/SUMMARY
[EKG obtained to assist in diagnosis and management of assessed problem(s)] : EKG obtained to assist in diagnosis and management of assessed problem(s) [FreeTextEntry1] : The patient was examined. Her blood pressure was 133/85,  and her pulse was 60..Her lungs were clear to auscultation. Cardiac exam was negative for murmurs rubs or gallops. The remainder of her physical exam was unremarkable. Her EKG showed  sinus rhythm, with a left bundle branch block pattern. This was unchanged from previous.The patient was instructed to stay on the same medication, and  return in 6  months, or earlier if needed.  Total time spent on the day of the encounter was 32 minutes which includes  face-to-face and non face-to-face times personally spent by the physician preparing to see the patient, obtaining  separately obtained history, performing a medically appropriate exam and evaluation, counseling, educating, talking to the family or caregivers, ordering medicines, ordering tests or procedures, referring and communicating with other healthcare professionals, and documenting clinical information in the electronic health record.

## 2023-06-12 NOTE — REASON FOR VISIT
[FreeTextEntry1] : The patient comes in for followup of her hypertension, hypothyroidism, left bundle-branch block, and dyslipidemia. She had her partial hysterectomy and bladder resuspension. It was not completely successful from her point of view. . She denies any chest pains, shortness of breath, or palpitations. .\par August 7, 2019: The patient feels well. She has no new complaints. She denies any chest pains, shortness breath, or palpitations. She is walking more.\par December 4, 2019: The patient has an upper respiratory infection. Her  has bronchitis. She denies any chest pains, shortness breath, or palpitations. She is a slight cough and some nasal congestion.\par Jessi 10, 2020: Patient feels well.  She denies any chest pains, shortness of breath, or palpitations.  She will be seeing her primary care physician within the next several weeks and he will be doing a blood test.  We will try to get a scan results.\par December 9, 2020: The patient fell going down stairs in the beginning of November 2020 and hurt her right anterior chest.  She now only has occasional pains in the right anterior and right lateral upper chest.  She denies any other chest pains, shortness of breath, or palpitations.  She admits to not watching salt as carefully as she should and her diet\par June 9, 2021: The patient feels well.  She got her COVID-19 vaccinations.  She denies any chest pains, shortness of breath, or palpitations.\par December 8, 2021: The patient has been diagnosed with spinal stenosis.  She went to a pain management doctor who has given her to epidural steroid injections.  She denies any chest pains, shortness of breath, or palpitations.\par June 8, 2022: The patient got her epidural steroid shots and her spinal stenosis seems to be better.  She denies any chest pains, shortness of breath, or palpitations.\par December 12, 2022: The patient has no new complaints.  She denies any shortness of breath, palpitations, or chest pains.\par June 12, 2023: The patient feels well.  She denies any chest pains, shortness of breath, or palpitations.

## 2023-08-02 ENCOUNTER — OFFICE (OUTPATIENT)
Dept: URBAN - METROPOLITAN AREA CLINIC 102 | Facility: CLINIC | Age: 73
Setting detail: OPHTHALMOLOGY
End: 2023-08-02
Payer: MEDICARE

## 2023-08-02 DIAGNOSIS — D31.30: ICD-10-CM

## 2023-08-02 DIAGNOSIS — H43.393: ICD-10-CM

## 2023-08-02 DIAGNOSIS — H16.222: ICD-10-CM

## 2023-08-02 DIAGNOSIS — H02.052: ICD-10-CM

## 2023-08-02 DIAGNOSIS — H16.223: ICD-10-CM

## 2023-08-02 DIAGNOSIS — H16.221: ICD-10-CM

## 2023-08-02 DIAGNOSIS — H01.004: ICD-10-CM

## 2023-08-02 DIAGNOSIS — H01.001: ICD-10-CM

## 2023-08-02 PROBLEM — H02.831 DERMATOCHALASIS; RIGHT UPPER LID, LEFT UPPER LID: Status: ACTIVE | Noted: 2023-08-02

## 2023-08-02 PROBLEM — H02.834 DERMATOCHALASIS; RIGHT UPPER LID, LEFT UPPER LID: Status: ACTIVE | Noted: 2023-08-02

## 2023-08-02 PROCEDURE — 67820 REVISE EYELASHES: CPT | Performed by: OPHTHALMOLOGY

## 2023-08-02 PROCEDURE — 92250 FUNDUS PHOTOGRAPHY W/I&R: CPT | Performed by: OPHTHALMOLOGY

## 2023-08-02 PROCEDURE — 99213 OFFICE O/P EST LOW 20 MIN: CPT | Performed by: OPHTHALMOLOGY

## 2023-08-02 PROCEDURE — 83861 MICROFLUID ANALY TEARS: CPT | Performed by: OPHTHALMOLOGY

## 2023-08-02 PROCEDURE — 68761 CLOSE TEAR DUCT OPENING: CPT | Performed by: OPHTHALMOLOGY

## 2023-08-02 ASSESSMENT — CORNEAL PTERYGIUM: OS_PTERYGIUM: NASAL 1MM

## 2023-08-02 ASSESSMENT — LID POSITION - DERMATOCHALASIS
OS_DERMATOCHALASIS: 2+
OD_DERMATOCHALASIS: 2+

## 2023-08-02 ASSESSMENT — TONOMETRY
OS_IOP_MMHG: 15
OD_IOP_MMHG: 13

## 2023-08-02 ASSESSMENT — REFRACTION_MANIFEST
OD_CYLINDER: -1.25
OD_SPHERE: +2.25
OD_SPHERE: +2.75
OS_AXIS: 060
OD_AXIS: 135
OS_ADD: +2.75
OD_VA1: 20/20
OS_SPHERE: +2.75
OS_CYLINDER: -1.00
OD_CYLINDER: -0.75
OS_ADD: +2.50
OD_AXIS: 138
OS_CYLINDER: -0.75
OS_AXIS: 065
OD_ADD: +2.50
OS_SPHERE: +2.75
OD_ADD: +2.75
OS_VA1: 20/20

## 2023-08-02 ASSESSMENT — REFRACTION_CURRENTRX
OD_AXIS: 134
OD_ADD: +2.50
OD_SPHERE: +2.25
OS_AXIS: 60
OD_OVR_VA: 20/
OS_CYLINDER: -1.00
OS_OVR_VA: 20/
OS_SPHERE: +2.75
OS_ADD: +2.50
OD_CYLINDER: -0.50

## 2023-08-02 ASSESSMENT — AXIALLENGTH_DERIVED
OD_AL: 22.3302
OS_AL: 22.1644
OD_AL: 22.2867
OD_AL: 22.4178
OS_AL: 22.0788
OS_AL: 22.1215

## 2023-08-02 ASSESSMENT — SPHEQUIV_DERIVED
OS_SPHEQUIV: 2.375
OD_SPHEQUIV: 2.125
OD_SPHEQUIV: 2.25
OS_SPHEQUIV: 2.25
OD_SPHEQUIV: 1.875
OS_SPHEQUIV: 2.5

## 2023-08-02 ASSESSMENT — CONFRONTATIONAL VISUAL FIELD TEST (CVF)
OS_FINDINGS: FULL
OD_FINDINGS: FULL

## 2023-08-02 ASSESSMENT — SUPERFICIAL PUNCTATE KERATITIS (SPK)
OD_SPK: T 1+
OS_SPK: T 1+

## 2023-08-02 ASSESSMENT — KERATOMETRY
OD_AXISANGLE_DEGREES: 048
OD_K1POWER_DIOPTERS: 44.25
METHOD_AUTO_MANUAL: AUTO
OS_AXISANGLE_DEGREES: 108
OS_K1POWER_DIOPTERS: 45.00
OD_K2POWER_DIOPTERS: 45.50
OS_K2POWER_DIOPTERS: 45.50

## 2023-08-02 ASSESSMENT — REFRACTION_AUTOREFRACTION
OS_CYLINDER: -0.50
OD_CYLINDER: -1.50
OD_SPHERE: +3.00
OS_SPHERE: +2.75
OS_AXIS: 037
OD_AXIS: 136

## 2023-08-02 ASSESSMENT — LID EXAM ASSESSMENTS
OD_TRICHIASIS: RLL 1+
OS_TRICHIASIS: 1+
OD_BLEPHARITIS: RUL T
OS_BLEPHARITIS: LUL T

## 2023-08-02 ASSESSMENT — VISUAL ACUITY
OD_BCVA: 20/20-2
OS_BCVA: 20/20-1

## 2023-11-06 ENCOUNTER — OFFICE (OUTPATIENT)
Dept: URBAN - METROPOLITAN AREA CLINIC 102 | Facility: CLINIC | Age: 73
Setting detail: OPHTHALMOLOGY
End: 2023-11-06
Payer: MEDICARE

## 2023-11-06 DIAGNOSIS — H40.033: ICD-10-CM

## 2023-11-06 DIAGNOSIS — H16.222: ICD-10-CM

## 2023-11-06 DIAGNOSIS — H43.393: ICD-10-CM

## 2023-11-06 DIAGNOSIS — H01.004: ICD-10-CM

## 2023-11-06 DIAGNOSIS — H16.221: ICD-10-CM

## 2023-11-06 DIAGNOSIS — H01.001: ICD-10-CM

## 2023-11-06 DIAGNOSIS — H02.052: ICD-10-CM

## 2023-11-06 DIAGNOSIS — H16.223: ICD-10-CM

## 2023-11-06 PROCEDURE — 83861 MICROFLUID ANALY TEARS: CPT | Mod: QW,RT | Performed by: OPHTHALMOLOGY

## 2023-11-06 PROCEDURE — 92020 GONIOSCOPY: CPT | Performed by: OPHTHALMOLOGY

## 2023-11-06 PROCEDURE — 99213 OFFICE O/P EST LOW 20 MIN: CPT | Mod: 25 | Performed by: OPHTHALMOLOGY

## 2023-11-06 PROCEDURE — 68761 CLOSE TEAR DUCT OPENING: CPT | Mod: 50 | Performed by: OPHTHALMOLOGY

## 2023-11-06 PROCEDURE — 83861 MICROFLUID ANALY TEARS: CPT | Mod: QW,LT | Performed by: OPHTHALMOLOGY

## 2023-11-06 ASSESSMENT — REFRACTION_MANIFEST
OS_CYLINDER: -0.75
OS_ADD: +2.50
OD_AXIS: 135
OD_VA1: 20/20
OD_ADD: +2.75
OD_CYLINDER: -0.75
OS_ADD: +2.75
OS_SPHERE: +2.75
OD_SPHERE: +2.25
OD_CYLINDER: -1.25
OS_AXIS: 060
OS_AXIS: 065
OS_CYLINDER: -1.00
OS_VA1: 20/20
OD_ADD: +2.50
OD_AXIS: 138
OD_SPHERE: +2.75
OS_SPHERE: +2.75

## 2023-11-06 ASSESSMENT — SPHEQUIV_DERIVED
OS_SPHEQUIV: 2.625
OD_SPHEQUIV: 2.125
OD_SPHEQUIV: 2.375
OS_SPHEQUIV: 2.25
OS_SPHEQUIV: 2.375
OD_SPHEQUIV: 1.875

## 2023-11-06 ASSESSMENT — CORNEAL PTERYGIUM: OS_PTERYGIUM: NASAL 1MM

## 2023-11-06 ASSESSMENT — REFRACTION_CURRENTRX
OD_AXIS: 134
OS_OVR_VA: 20/
OD_ADD: +2.50
OD_OVR_VA: 20/
OD_SPHERE: +2.25
OS_AXIS: 60
OS_CYLINDER: -1.00
OS_ADD: +2.50
OS_SPHERE: +2.75
OD_CYLINDER: -0.50

## 2023-11-06 ASSESSMENT — REFRACTION_AUTOREFRACTION
OS_AXIS: 62
OD_SPHERE: +3.00
OD_CYLINDER: -1.25
OS_SPHERE: +3.00
OD_AXIS: 137
OS_CYLINDER: -0.75

## 2023-11-06 ASSESSMENT — CONFRONTATIONAL VISUAL FIELD TEST (CVF)
OS_FINDINGS: FULL
OD_FINDINGS: FULL

## 2023-11-06 ASSESSMENT — LID POSITION - DERMATOCHALASIS
OS_DERMATOCHALASIS: 2+
OD_DERMATOCHALASIS: 2+

## 2023-11-06 ASSESSMENT — SUPERFICIAL PUNCTATE KERATITIS (SPK)
OS_SPK: T 1+
OD_SPK: T 1+

## 2023-11-06 ASSESSMENT — LID EXAM ASSESSMENTS
OS_BLEPHARITIS: LUL T
OD_TRICHIASIS: RLL 1+
OS_TRICHIASIS: 1+
OD_BLEPHARITIS: RUL T

## 2023-12-11 ENCOUNTER — NON-APPOINTMENT (OUTPATIENT)
Age: 73
End: 2023-12-11

## 2023-12-11 ENCOUNTER — APPOINTMENT (OUTPATIENT)
Dept: CARDIOLOGY | Facility: CLINIC | Age: 73
End: 2023-12-11
Payer: MEDICARE

## 2023-12-11 VITALS
SYSTOLIC BLOOD PRESSURE: 134 MMHG | OXYGEN SATURATION: 98 % | HEART RATE: 57 BPM | DIASTOLIC BLOOD PRESSURE: 88 MMHG | WEIGHT: 150 LBS | BODY MASS INDEX: 27.44 KG/M2

## 2023-12-11 DIAGNOSIS — E78.00 PURE HYPERCHOLESTEROLEMIA, UNSPECIFIED: ICD-10-CM

## 2023-12-11 DIAGNOSIS — I10 ESSENTIAL (PRIMARY) HYPERTENSION: ICD-10-CM

## 2023-12-11 PROCEDURE — 99214 OFFICE O/P EST MOD 30 MIN: CPT

## 2023-12-11 PROCEDURE — 93000 ELECTROCARDIOGRAM COMPLETE: CPT

## 2023-12-11 RX ORDER — AMOXICILLIN 500 MG/1
500 CAPSULE ORAL
Qty: 16 | Refills: 0 | Status: DISCONTINUED | COMMUNITY
Start: 2022-07-26 | End: 2023-12-11

## 2023-12-11 RX ORDER — ESTRADIOL 0.1 MG/G
0.1 CREAM VAGINAL
Qty: 42 | Refills: 0 | Status: DISCONTINUED | COMMUNITY
Start: 2018-02-28 | End: 2023-12-11

## 2023-12-11 RX ORDER — OXYBUTYNIN CHLORIDE 10 MG/1
10 TABLET, EXTENDED RELEASE ORAL
Qty: 30 | Refills: 0 | Status: DISCONTINUED | COMMUNITY
Start: 2018-03-27 | End: 2023-12-11

## 2024-05-12 ENCOUNTER — NON-APPOINTMENT (OUTPATIENT)
Age: 74
End: 2024-05-12

## 2024-05-29 ENCOUNTER — OFFICE (OUTPATIENT)
Dept: URBAN - METROPOLITAN AREA CLINIC 102 | Facility: CLINIC | Age: 74
Setting detail: OPHTHALMOLOGY
End: 2024-05-29
Payer: MEDICARE

## 2024-05-29 DIAGNOSIS — H16.222: ICD-10-CM

## 2024-05-29 DIAGNOSIS — H16.223: ICD-10-CM

## 2024-05-29 DIAGNOSIS — H16.221: ICD-10-CM

## 2024-05-29 DIAGNOSIS — H25.13: ICD-10-CM

## 2024-05-29 DIAGNOSIS — H40.033: ICD-10-CM

## 2024-05-29 DIAGNOSIS — D31.30: ICD-10-CM

## 2024-05-29 DIAGNOSIS — H43.393: ICD-10-CM

## 2024-05-29 PROCEDURE — 83861 MICROFLUID ANALY TEARS: CPT | Mod: QW,RT | Performed by: OPHTHALMOLOGY

## 2024-05-29 PROCEDURE — 83861 MICROFLUID ANALY TEARS: CPT | Mod: QW,LT | Performed by: OPHTHALMOLOGY

## 2024-05-29 PROCEDURE — 92250 FUNDUS PHOTOGRAPHY W/I&R: CPT | Performed by: OPHTHALMOLOGY

## 2024-05-29 PROCEDURE — 68761 CLOSE TEAR DUCT OPENING: CPT | Mod: 50 | Performed by: OPHTHALMOLOGY

## 2024-05-29 PROCEDURE — 92014 COMPRE OPH EXAM EST PT 1/>: CPT | Mod: 25 | Performed by: OPHTHALMOLOGY

## 2024-05-29 ASSESSMENT — LID EXAM ASSESSMENTS
OD_BLEPHARITIS: RUL T
OD_TRICHIASIS: RLL 1+
OS_TRICHIASIS: 1+
OS_BLEPHARITIS: LUL T

## 2024-05-29 ASSESSMENT — LID POSITION - DERMATOCHALASIS
OS_DERMATOCHALASIS: 2+
OD_DERMATOCHALASIS: 2+

## 2024-05-29 ASSESSMENT — CONFRONTATIONAL VISUAL FIELD TEST (CVF)
OD_FINDINGS: FULL
OS_FINDINGS: FULL

## 2024-07-18 ENCOUNTER — APPOINTMENT (OUTPATIENT)
Dept: CARDIOLOGY | Facility: CLINIC | Age: 74
End: 2024-07-18
Payer: MEDICARE

## 2024-07-18 ENCOUNTER — NON-APPOINTMENT (OUTPATIENT)
Age: 74
End: 2024-07-18

## 2024-07-18 VITALS
BODY MASS INDEX: 27.62 KG/M2 | WEIGHT: 151 LBS | HEART RATE: 51 BPM | SYSTOLIC BLOOD PRESSURE: 132 MMHG | OXYGEN SATURATION: 95 % | DIASTOLIC BLOOD PRESSURE: 84 MMHG

## 2024-07-18 DIAGNOSIS — R73.03 PREDIABETES.: ICD-10-CM

## 2024-07-18 DIAGNOSIS — I10 ESSENTIAL (PRIMARY) HYPERTENSION: ICD-10-CM

## 2024-07-18 DIAGNOSIS — E78.00 PURE HYPERCHOLESTEROLEMIA, UNSPECIFIED: ICD-10-CM

## 2024-07-18 DIAGNOSIS — M81.0 AGE-RELATED OSTEOPOROSIS W/OUT CURRENT PATHOLOGICAL FRACTURE: ICD-10-CM

## 2024-07-18 DIAGNOSIS — E03.9 HYPOTHYROIDISM, UNSPECIFIED: ICD-10-CM

## 2024-07-18 PROCEDURE — 99213 OFFICE O/P EST LOW 20 MIN: CPT

## 2024-07-18 PROCEDURE — G2211 COMPLEX E/M VISIT ADD ON: CPT

## 2024-07-18 PROCEDURE — 93000 ELECTROCARDIOGRAM COMPLETE: CPT

## 2024-08-05 PROBLEM — Z13.820 SCREENING FOR OSTEOPOROSIS: Status: ACTIVE | Noted: 2024-08-05

## 2024-08-06 ENCOUNTER — APPOINTMENT (OUTPATIENT)
Dept: OBGYN | Facility: CLINIC | Age: 74
End: 2024-08-06

## 2024-08-06 PROBLEM — Z01.411 ENCOUNTER FOR GYNECOLOGICAL EXAMINATION (GENERAL) (ROUTINE) WITH ABNORMAL FINDINGS: Status: ACTIVE | Noted: 2024-08-05

## 2024-08-06 PROBLEM — N81.11 CYSTOCELE, MIDLINE: Status: RESOLVED | Noted: 2022-07-08 | Resolved: 2024-08-06

## 2024-08-06 PROBLEM — Z87.42 HISTORY OF UTERINE PROLAPSE: Status: RESOLVED | Noted: 2022-07-08 | Resolved: 2024-08-06

## 2024-08-06 PROBLEM — N95.2 VAGINAL ATROPHY: Status: ACTIVE | Noted: 2024-08-05

## 2024-08-06 PROCEDURE — G0101: CPT

## 2024-08-06 NOTE — PLAN
[FreeTextEntry1] : 1)  Self breast exam instructions, mammography discussed with the patient. 2)  Lipid profile assessment, TSH screening, fasting glucose testing, and vitamin D supplementation were discussed with the patient. 3)  Maintain healthy weight. 4)  Regular health maintenance with PCP. 5)  Remain tobacco free. 6)  Limit alcohol intake to less than 5 drinks per week. 7)  Osteoporosis screening and colonoscopy screening. 8)  Annual cholesterol screening. 9)  Annual influenza vaccine. 10) The importance of routine physical activity was reviewed and a goal of 150 minutes of moderately vigorous exercise per week was endorsed.   Yearly breast cancer screening with no current clinical or radiographic concerns. The patient was reminded regarding future well breast and general healthcare, breast cancer risk reduction, the importance of self-examination and the need for follow up. She was again reminded of the need to take Vit D3 2500 IU daily or to keep a Vit D level above 30, and Turmeric 1000 mg daily with Black Pepper. Plan continued yearly imaging and breast follow up, sooner as needed. I counseled the patient on current recommendations to reduce breast cancer risk including but not inclusive to regular exercise 20-30 minutes 3-4 times a week, low fat diet, limiting alcohol consumption, maintenance of ideal body weight, yearly imaging and self-breast awareness. Questions answered. I encouraged in light of COVID-19, social distancing, frequent hand washing and precautions to stay healthy.  Patient is aware of their moderate uterine prolapse and cystorectocele. While typically benign, uterine prolapse with a cystorectocele can affect quality of life. Surgical options were recommended, but a pessary or pelvic floor therapy was discussed. A surgical consult is to be made. Patient is aware that pelvic organ reconstruction surgery would be done in conjunction with another surgeon (URO-GYN). Patient is also aware the surgery includes a partial hysterectomy, which leaves the cervix, and then pelvic organs being pulled up and anchored by the URO/GYN.  Patient does not desire surgery and states it doesn't impact her quality of life enough to warrant surgery.  A pessary is a soft flexible device that is placed in the vagina to help support the bladder, vaginal, uterus, and/or rectum. Pessaries are made in many different shapes and sizes. The pessary is a nonsurgical way to treat for pelvic organ prolapse and sometimes urinary incontinence. A pessary can worsen or improve urinary incontinence and in extreme cases can cause urinary retention. Because pessaries push against the lining of the vagina, they can irritate the vaginal wall and even lead to pressure sores. Pessaries also can lead to problems with bowel movements or alter the way the patient urinates. A pessary requires maintenance. The maintenance can be done by the patient, for the patient can follow up in our office approximately every 3 months to have the pessary removed, cleaned, and replaced.   I have spent 30 minutes of time on this encounter. Greater than 50% of the face-to-face encounter time was spent on counseling and/or coordination of care for examination findings, differential, testing, management and planning.

## 2024-08-06 NOTE — PHYSICAL EXAM
[Chaperone Present] : A chaperone was present in the examining room during all aspects of the physical examination [[id number]] : A chaperone was present during the pelvic exam. [Appropriately responsive] : appropriately responsive [Alert] : alert [No Acute Distress] : no acute distress [No Lymphadenopathy] : no lymphadenopathy [Soft] : soft [Non-tender] : non-tender [Non-distended] : non-distended [No HSM] : No HSM [No Lesions] : no lesions [No Mass] : no mass [Oriented x3] : oriented x3 [Examination Of The Breasts] : a normal appearance [No Masses] : no breast masses were palpable [Labia Majora] : normal [Labia Minora] : normal [Atrophy] : atrophy [Uterine Prolapse] : uterine prolapse [Normal] : normal [Absent] : absent [External Hemorrhoid] : external hemorrhoid [Uterine Adnexae] : absent [FreeTextEntry2] : Jeni Lazo [FreeTextEntry3] : This note was written by Jeni Lazo on 08/06/2024, acting as a scribe for Dr. Ankush Myers MD. All medic record entries were at my, Dr. Ankush Myers MD, direction and personally dictated by me in 08/06/2024. I have personally reviewed the chart and agree that the record accurately reflects my personal performance of the history, physical exam, assessment, and plan.  [FreeTextEntry9] : rectal deferred - upcoming colonoscopy

## 2024-08-06 NOTE — HISTORY OF PRESENT ILLNESS
[LMP unknown] : LMP unknown [postmenopausal] : postmenopausal [N] : Patient is not sexually active [Y] : Positive pregnancy history [No] : Patient does not have concerns regarding sex [Previously active] : previously active [TextBox_4] : The 73-year-old patient presents today for a routine GYN exam. Patient is doing well. She offers no current GYN complaints. Patient notes she was prescribed ketoconazole for a heat rash underneath her breasts and in between her legs. We reviewed together in detail her past medical and surgical histories, allergies and medication usage, social and family history. All questions were answered in easy-to-understand language. [Mammogramdate] : 07/29/2024 [BreastSonogramDate] : 07/29/2024 [PapSmeardate] : 07/29/2022 [BoneDensityDate] : 07/25/2022 [ColonoscopyDate] : 2019 [TextBox_78] : No history of HPV [PGHxTotal] : 2 [[facility] : 2 [FreeTextEntry2] : Not at this time

## 2024-09-04 ENCOUNTER — OFFICE (OUTPATIENT)
Dept: URBAN - METROPOLITAN AREA CLINIC 102 | Facility: CLINIC | Age: 74
Setting detail: OPHTHALMOLOGY
End: 2024-09-04
Payer: MEDICARE

## 2024-09-04 DIAGNOSIS — H25.13: ICD-10-CM

## 2024-09-04 DIAGNOSIS — D31.30: ICD-10-CM

## 2024-09-04 DIAGNOSIS — H02.052: ICD-10-CM

## 2024-09-04 DIAGNOSIS — H16.222: ICD-10-CM

## 2024-09-04 DIAGNOSIS — H43.393: ICD-10-CM

## 2024-09-04 DIAGNOSIS — H40.033: ICD-10-CM

## 2024-09-04 DIAGNOSIS — H16.221: ICD-10-CM

## 2024-09-04 DIAGNOSIS — H16.223: ICD-10-CM

## 2024-09-04 PROBLEM — H02.833 DERMATOCHALASIS; RIGHT UPPER LID, LEFT UPPER LID: Status: ACTIVE | Noted: 2024-09-04

## 2024-09-04 PROCEDURE — 68761 CLOSE TEAR DUCT OPENING: CPT | Mod: 50 | Performed by: OPHTHALMOLOGY

## 2024-09-04 PROCEDURE — 67820 REVISE EYELASHES: CPT | Mod: 51,RT | Performed by: OPHTHALMOLOGY

## 2024-09-04 PROCEDURE — 83861 MICROFLUID ANALY TEARS: CPT | Mod: QW,LT | Performed by: OPHTHALMOLOGY

## 2024-09-04 PROCEDURE — 83861 MICROFLUID ANALY TEARS: CPT | Mod: QW,RT | Performed by: OPHTHALMOLOGY

## 2024-09-04 PROCEDURE — 92020 GONIOSCOPY: CPT | Performed by: OPHTHALMOLOGY

## 2024-09-04 PROCEDURE — 99213 OFFICE O/P EST LOW 20 MIN: CPT | Mod: 25 | Performed by: OPHTHALMOLOGY

## 2024-09-04 ASSESSMENT — LID POSITION - DERMATOCHALASIS
OD_DERMATOCHALASIS: 2+
OS_DERMATOCHALASIS: 2+

## 2024-09-04 ASSESSMENT — LID EXAM ASSESSMENTS
OD_BLEPHARITIS: RUL T
OS_TRICHIASIS: 1+
OD_TRICHIASIS: RLL 1+
OS_BLEPHARITIS: LUL T

## 2024-09-04 ASSESSMENT — CONFRONTATIONAL VISUAL FIELD TEST (CVF)
OS_FINDINGS: FULL
OD_FINDINGS: FULL

## 2024-12-11 ENCOUNTER — OFFICE (OUTPATIENT)
Dept: URBAN - METROPOLITAN AREA CLINIC 102 | Facility: CLINIC | Age: 74
Setting detail: OPHTHALMOLOGY
End: 2024-12-11
Payer: MEDICARE

## 2024-12-11 DIAGNOSIS — H16.221: ICD-10-CM

## 2024-12-11 DIAGNOSIS — D31.31: ICD-10-CM

## 2024-12-11 DIAGNOSIS — H16.223: ICD-10-CM

## 2024-12-11 DIAGNOSIS — H16.222: ICD-10-CM

## 2024-12-11 DIAGNOSIS — H25.13: ICD-10-CM

## 2024-12-11 DIAGNOSIS — H43.393: ICD-10-CM

## 2024-12-11 PROBLEM — H02.831 DERMATOCHALASIS; RIGHT UPPER LID, LEFT UPPER LID: Status: ACTIVE | Noted: 2024-12-11

## 2024-12-11 PROBLEM — H01.004 BLEPHARITIS; RIGHT UPPER LID, LEFT UPPER LID, , RIGHT LOWER LID, LEFT LOWER LID: Status: ACTIVE | Noted: 2024-12-11

## 2024-12-11 PROBLEM — H01.005 BLEPHARITIS; RIGHT UPPER LID, LEFT UPPER LID, , RIGHT LOWER LID, LEFT LOWER LID: Status: ACTIVE | Noted: 2024-12-11

## 2024-12-11 PROBLEM — H01.002 BLEPHARITIS; RIGHT UPPER LID, LEFT UPPER LID, , RIGHT LOWER LID, LEFT LOWER LID: Status: ACTIVE | Noted: 2024-12-11

## 2024-12-11 PROBLEM — H02.834 DERMATOCHALASIS; RIGHT UPPER LID, LEFT UPPER LID: Status: ACTIVE | Noted: 2024-12-11

## 2024-12-11 PROBLEM — H01.001 BLEPHARITIS; RIGHT UPPER LID, LEFT UPPER LID, , RIGHT LOWER LID, LEFT LOWER LID: Status: ACTIVE | Noted: 2024-12-11

## 2024-12-11 PROCEDURE — 83861 MICROFLUID ANALY TEARS: CPT | Mod: QW,LT | Performed by: OPHTHALMOLOGY

## 2024-12-11 PROCEDURE — 92014 COMPRE OPH EXAM EST PT 1/>: CPT | Mod: 25 | Performed by: OPHTHALMOLOGY

## 2024-12-11 PROCEDURE — 68761 CLOSE TEAR DUCT OPENING: CPT | Mod: 50 | Performed by: OPHTHALMOLOGY

## 2024-12-11 PROCEDURE — 92250 FUNDUS PHOTOGRAPHY W/I&R: CPT | Performed by: OPHTHALMOLOGY

## 2024-12-11 PROCEDURE — 83861 MICROFLUID ANALY TEARS: CPT | Mod: QW,RT | Performed by: OPHTHALMOLOGY

## 2024-12-11 ASSESSMENT — REFRACTION_CURRENTRX
OS_SPHERE: +2.75
OS_AXIS: 60
OS_OVR_VA: 20/
OD_AXIS: 134
OD_CYLINDER: -0.50
OD_ADD: +2.50
OS_ADD: +2.50
OS_CYLINDER: -1.00
OD_OVR_VA: 20/
OD_SPHERE: +2.25

## 2024-12-11 ASSESSMENT — REFRACTION_MANIFEST
OS_SPHERE: +2.75
OS_CYLINDER: -0.75
OD_CYLINDER: -1.25
OD_CYLINDER: -0.75
OD_SPHERE: +2.25
OS_ADD: +2.50
OD_ADD: +2.50
OD_ADD: +2.75
OS_CYLINDER: -1.00
OS_ADD: +2.75
OD_AXIS: 135
OS_SPHERE: +2.75
OD_AXIS: 138
OD_SPHERE: +2.75
OS_AXIS: 065
OS_AXIS: 060
OD_VA1: 20/20
OS_VA1: 20/20

## 2024-12-11 ASSESSMENT — SUPERFICIAL PUNCTATE KERATITIS (SPK)
OS_SPK: T
OD_SPK: T

## 2024-12-11 ASSESSMENT — CONFRONTATIONAL VISUAL FIELD TEST (CVF)
OD_FINDINGS: FULL
OS_FINDINGS: FULL

## 2024-12-11 ASSESSMENT — LID POSITION - DERMATOCHALASIS
OD_DERMATOCHALASIS: 2+
OS_DERMATOCHALASIS: 2+

## 2024-12-11 ASSESSMENT — KERATOMETRY
OS_K2POWER_DIOPTERS: 45.25
METHOD_AUTO_MANUAL: AUTO
OS_AXISANGLE_DEGREES: 138
OD_AXISANGLE_DEGREES: 058
OD_K1POWER_DIOPTERS: 44.50
OD_K2POWER_DIOPTERS: 45.50
OS_K1POWER_DIOPTERS: 45.00

## 2024-12-11 ASSESSMENT — LID EXAM ASSESSMENTS
OD_BLEPHARITIS: RLL RUL T
OS_BLEPHARITIS: LLL LUL T

## 2024-12-11 ASSESSMENT — REFRACTION_AUTOREFRACTION
OS_CYLINDER: -0.75
OD_AXIS: 142
OS_AXIS: 063
OS_SPHERE: +3.25
OD_SPHERE: +3.25
OD_CYLINDER: -1.25

## 2024-12-11 ASSESSMENT — VISUAL ACUITY
OS_BCVA: 20/20-1
OD_BCVA: 20/20-2

## 2024-12-11 ASSESSMENT — CORNEAL PTERYGIUM: OS_PTERYGIUM: NASAL 1MM

## 2024-12-11 ASSESSMENT — TONOMETRY
OS_IOP_MMHG: 16
OD_IOP_MMHG: 15

## 2025-01-13 ENCOUNTER — NON-APPOINTMENT (OUTPATIENT)
Age: 75
End: 2025-01-13

## 2025-01-13 ENCOUNTER — APPOINTMENT (OUTPATIENT)
Dept: CARDIOLOGY | Facility: CLINIC | Age: 75
End: 2025-01-13
Payer: MEDICARE

## 2025-01-13 VITALS
WEIGHT: 150 LBS | BODY MASS INDEX: 27.44 KG/M2 | DIASTOLIC BLOOD PRESSURE: 80 MMHG | OXYGEN SATURATION: 97 % | SYSTOLIC BLOOD PRESSURE: 120 MMHG | HEART RATE: 64 BPM

## 2025-01-13 DIAGNOSIS — E78.00 PURE HYPERCHOLESTEROLEMIA, UNSPECIFIED: ICD-10-CM

## 2025-01-13 DIAGNOSIS — E03.9 HYPOTHYROIDISM, UNSPECIFIED: ICD-10-CM

## 2025-01-13 DIAGNOSIS — I10 ESSENTIAL (PRIMARY) HYPERTENSION: ICD-10-CM

## 2025-01-13 PROCEDURE — G2211 COMPLEX E/M VISIT ADD ON: CPT

## 2025-01-13 PROCEDURE — 93000 ELECTROCARDIOGRAM COMPLETE: CPT

## 2025-01-13 PROCEDURE — 99213 OFFICE O/P EST LOW 20 MIN: CPT

## 2025-01-16 ENCOUNTER — APPOINTMENT (OUTPATIENT)
Dept: CARDIOLOGY | Facility: CLINIC | Age: 75
End: 2025-01-16

## 2025-03-12 ENCOUNTER — OFFICE (OUTPATIENT)
Dept: URBAN - METROPOLITAN AREA CLINIC 102 | Facility: CLINIC | Age: 75
Setting detail: OPHTHALMOLOGY
End: 2025-03-12
Payer: MEDICARE

## 2025-03-12 DIAGNOSIS — H43.393: ICD-10-CM

## 2025-03-12 DIAGNOSIS — D31.31: ICD-10-CM

## 2025-03-12 DIAGNOSIS — H40.033: ICD-10-CM

## 2025-03-12 DIAGNOSIS — H16.222: ICD-10-CM

## 2025-03-12 DIAGNOSIS — H16.221: ICD-10-CM

## 2025-03-12 DIAGNOSIS — H25.13: ICD-10-CM

## 2025-03-12 DIAGNOSIS — H16.223: ICD-10-CM

## 2025-03-12 PROCEDURE — 99213 OFFICE O/P EST LOW 20 MIN: CPT | Mod: 25 | Performed by: OPHTHALMOLOGY

## 2025-03-12 PROCEDURE — 83861 MICROFLUID ANALY TEARS: CPT | Mod: QW,LT | Performed by: OPHTHALMOLOGY

## 2025-03-12 PROCEDURE — 83861 MICROFLUID ANALY TEARS: CPT | Mod: QW,RT | Performed by: OPHTHALMOLOGY

## 2025-03-12 PROCEDURE — 68761 CLOSE TEAR DUCT OPENING: CPT | Mod: 50 | Performed by: OPHTHALMOLOGY

## 2025-03-12 ASSESSMENT — KERATOMETRY
OS_K2POWER_DIOPTERS: 45.25
METHOD_AUTO_MANUAL: AUTO
OS_K1POWER_DIOPTERS: 45.00
OS_AXISANGLE_DEGREES: 120
OD_K1POWER_DIOPTERS: 44.00
OD_K2POWER_DIOPTERS: 45.25
OD_AXISANGLE_DEGREES: 50

## 2025-03-12 ASSESSMENT — REFRACTION_MANIFEST
OS_CYLINDER: -0.75
OD_ADD: +2.50
OD_CYLINDER: -1.25
OS_ADD: +2.50
OS_AXIS: 065
OD_VA1: 20/20
OD_SPHERE: +2.25
OD_ADD: +2.75
OS_SPHERE: +2.75
OD_AXIS: 135
OS_ADD: +2.75
OS_VA1: 20/20
OS_CYLINDER: -1.00
OS_SPHERE: +2.75
OS_AXIS: 060
OD_SPHERE: +2.75
OD_AXIS: 138
OD_CYLINDER: -0.75

## 2025-03-12 ASSESSMENT — REFRACTION_CURRENTRX
OS_SPHERE: +2.75
OD_ADD: +2.50
OS_ADD: +2.50
OD_SPHERE: +2.25
OD_OVR_VA: 20/
OS_AXIS: 60
OS_OVR_VA: 20/
OD_CYLINDER: -0.50
OD_AXIS: 134
OS_CYLINDER: -1.00

## 2025-03-12 ASSESSMENT — CONFRONTATIONAL VISUAL FIELD TEST (CVF)
OS_FINDINGS: FULL
OD_FINDINGS: FULL

## 2025-03-12 ASSESSMENT — VISUAL ACUITY
OD_BCVA: 20/25-2
OS_BCVA: 20/20

## 2025-03-12 ASSESSMENT — REFRACTION_AUTOREFRACTION
OS_CYLINDER: -0.75
OD_CYLINDER: -1.00
OS_AXIS: 54
OD_AXIS: 133
OS_SPHERE: +3.00
OD_SPHERE: +3.25

## 2025-03-12 ASSESSMENT — LID EXAM ASSESSMENTS
OD_BLEPHARITIS: RLL RUL T
OS_BLEPHARITIS: LLL LUL T

## 2025-03-12 ASSESSMENT — LID POSITION - DERMATOCHALASIS
OS_DERMATOCHALASIS: 2+
OD_DERMATOCHALASIS: 2+

## 2025-03-12 ASSESSMENT — SUPERFICIAL PUNCTATE KERATITIS (SPK)
OS_SPK: T
OD_SPK: T

## 2025-03-12 ASSESSMENT — CORNEAL PTERYGIUM: OS_PTERYGIUM: NASAL 1MM

## 2025-03-12 ASSESSMENT — TONOMETRY
OD_IOP_MMHG: 15
OS_IOP_MMHG: 15

## 2025-05-02 ENCOUNTER — RX RENEWAL (OUTPATIENT)
Age: 75
End: 2025-05-02

## 2025-06-11 ENCOUNTER — OFFICE (OUTPATIENT)
Dept: URBAN - METROPOLITAN AREA CLINIC 102 | Facility: CLINIC | Age: 75
Setting detail: OPHTHALMOLOGY
End: 2025-06-11
Payer: MEDICARE

## 2025-06-11 DIAGNOSIS — H25.13: ICD-10-CM

## 2025-06-11 DIAGNOSIS — H01.001: ICD-10-CM

## 2025-06-11 DIAGNOSIS — H16.223: ICD-10-CM

## 2025-06-11 DIAGNOSIS — H01.004: ICD-10-CM

## 2025-06-11 DIAGNOSIS — H11.042: ICD-10-CM

## 2025-06-11 DIAGNOSIS — H16.222: ICD-10-CM

## 2025-06-11 DIAGNOSIS — H43.393: ICD-10-CM

## 2025-06-11 DIAGNOSIS — H01.005: ICD-10-CM

## 2025-06-11 DIAGNOSIS — H01.002: ICD-10-CM

## 2025-06-11 DIAGNOSIS — H40.033: ICD-10-CM

## 2025-06-11 DIAGNOSIS — H16.221: ICD-10-CM

## 2025-06-11 DIAGNOSIS — D31.31: ICD-10-CM

## 2025-06-11 PROCEDURE — 68761 CLOSE TEAR DUCT OPENING: CPT | Mod: 50 | Performed by: OPHTHALMOLOGY

## 2025-06-11 PROCEDURE — 92020 GONIOSCOPY: CPT | Performed by: OPHTHALMOLOGY

## 2025-06-11 PROCEDURE — 83861 MICROFLUID ANALY TEARS: CPT | Mod: QW,LT | Performed by: OPHTHALMOLOGY

## 2025-06-11 PROCEDURE — 83861 MICROFLUID ANALY TEARS: CPT | Mod: QW,RT | Performed by: OPHTHALMOLOGY

## 2025-06-11 PROCEDURE — 99213 OFFICE O/P EST LOW 20 MIN: CPT | Mod: 25 | Performed by: OPHTHALMOLOGY

## 2025-06-11 ASSESSMENT — LID POSITION - DERMATOCHALASIS
OS_DERMATOCHALASIS: 2+
OD_DERMATOCHALASIS: 2+

## 2025-06-11 ASSESSMENT — REFRACTION_MANIFEST
OD_CYLINDER: -0.75
OD_AXIS: 135
OS_SPHERE: +2.75
OD_SPHERE: +2.25
OD_ADD: +2.75
OS_AXIS: 065
OD_SPHERE: +2.75
OS_CYLINDER: -0.75
OS_CYLINDER: -1.00
OS_ADD: +2.75
OD_CYLINDER: -1.25
OD_AXIS: 138
OS_VA1: 20/20
OD_ADD: +2.50
OD_VA1: 20/20
OS_AXIS: 060
OS_ADD: +2.50
OS_SPHERE: +2.75

## 2025-06-11 ASSESSMENT — REFRACTION_CURRENTRX
OD_SPHERE: +2.25
OS_ADD: +2.50
OD_AXIS: 134
OS_AXIS: 60
OS_SPHERE: +2.75
OD_CYLINDER: -0.50
OS_OVR_VA: 20/
OS_CYLINDER: -1.00
OD_ADD: +2.50
OD_OVR_VA: 20/

## 2025-06-11 ASSESSMENT — KERATOMETRY
OD_K1POWER_DIOPTERS: 44.00
OD_K2POWER_DIOPTERS: 45.50
METHOD_AUTO_MANUAL: AUTO
OS_K2POWER_DIOPTERS: 45.25
OS_K1POWER_DIOPTERS: 44.75
OD_AXISANGLE_DEGREES: 57
OS_AXISANGLE_DEGREES: 116

## 2025-06-11 ASSESSMENT — REFRACTION_AUTOREFRACTION
OS_SPHERE: +3.25
OD_CYLINDER: -1.25
OS_AXIS: 65
OD_SPHERE: +3.25
OD_AXIS: 136
OS_CYLINDER: -0.75

## 2025-06-11 ASSESSMENT — SUPERFICIAL PUNCTATE KERATITIS (SPK)
OD_SPK: T
OS_SPK: T

## 2025-06-11 ASSESSMENT — TONOMETRY
OD_IOP_MMHG: 17
OS_IOP_MMHG: 16

## 2025-06-11 ASSESSMENT — CORNEAL PTERYGIUM: OS_PTERYGIUM: NASAL 1MM

## 2025-06-11 ASSESSMENT — VISUAL ACUITY
OS_BCVA: 20/20
OD_BCVA: 20/25

## 2025-06-11 ASSESSMENT — LID EXAM ASSESSMENTS
OD_BLEPHARITIS: RLL RUL T
OS_BLEPHARITIS: LLL LUL T

## 2025-06-11 ASSESSMENT — CONFRONTATIONAL VISUAL FIELD TEST (CVF)
OS_FINDINGS: FULL
OD_FINDINGS: FULL

## 2025-07-14 ENCOUNTER — APPOINTMENT (OUTPATIENT)
Dept: CARDIOLOGY | Facility: CLINIC | Age: 75
End: 2025-07-14

## 2025-08-15 ENCOUNTER — APPOINTMENT (OUTPATIENT)
Dept: CARDIOLOGY | Facility: CLINIC | Age: 75
End: 2025-08-15
Payer: MEDICARE

## 2025-08-15 ENCOUNTER — NON-APPOINTMENT (OUTPATIENT)
Age: 75
End: 2025-08-15

## 2025-08-15 VITALS
SYSTOLIC BLOOD PRESSURE: 124 MMHG | HEIGHT: 62 IN | BODY MASS INDEX: 27.79 KG/M2 | WEIGHT: 151 LBS | DIASTOLIC BLOOD PRESSURE: 84 MMHG | HEART RATE: 58 BPM

## 2025-08-15 DIAGNOSIS — I10 ESSENTIAL (PRIMARY) HYPERTENSION: ICD-10-CM

## 2025-08-15 DIAGNOSIS — R73.03 PREDIABETES.: ICD-10-CM

## 2025-08-15 DIAGNOSIS — E03.9 HYPOTHYROIDISM, UNSPECIFIED: ICD-10-CM

## 2025-08-15 DIAGNOSIS — E78.00 PURE HYPERCHOLESTEROLEMIA, UNSPECIFIED: ICD-10-CM

## 2025-08-15 PROCEDURE — 93000 ELECTROCARDIOGRAM COMPLETE: CPT

## 2025-08-15 PROCEDURE — 99213 OFFICE O/P EST LOW 20 MIN: CPT

## 2025-08-15 PROCEDURE — G2211 COMPLEX E/M VISIT ADD ON: CPT
